# Patient Record
Sex: MALE | Race: WHITE | NOT HISPANIC OR LATINO
[De-identification: names, ages, dates, MRNs, and addresses within clinical notes are randomized per-mention and may not be internally consistent; named-entity substitution may affect disease eponyms.]

---

## 2018-10-12 ENCOUNTER — TRANSCRIPTION ENCOUNTER (OUTPATIENT)
Age: 58
End: 2018-10-12

## 2020-07-13 ENCOUNTER — INPATIENT (INPATIENT)
Facility: HOSPITAL | Age: 60
LOS: 1 days | Discharge: HOME | DRG: 202 | End: 2020-07-15
Attending: FAMILY MEDICINE | Admitting: INTERNAL MEDICINE
Payer: COMMERCIAL

## 2020-07-13 VITALS — WEIGHT: 210.1 LBS

## 2020-07-13 DIAGNOSIS — R94.31 ABNORMAL ELECTROCARDIOGRAM [ECG] [EKG]: ICD-10-CM

## 2020-07-13 DIAGNOSIS — J45.909 UNSPECIFIED ASTHMA, UNCOMPLICATED: ICD-10-CM

## 2020-07-13 DIAGNOSIS — I48.91 UNSPECIFIED ATRIAL FIBRILLATION: ICD-10-CM

## 2020-07-13 DIAGNOSIS — J90 PLEURAL EFFUSION, NOT ELSEWHERE CLASSIFIED: ICD-10-CM

## 2020-07-13 DIAGNOSIS — J45.901 UNSPECIFIED ASTHMA WITH (ACUTE) EXACERBATION: ICD-10-CM

## 2020-07-13 LAB
ALBUMIN SERPL ELPH-MCNC: 3.8 G/DL — SIGNIFICANT CHANGE UP (ref 3.3–5)
ALBUMIN SERPL ELPH-MCNC: 4.1 G/DL — SIGNIFICANT CHANGE UP (ref 3.3–5)
ALP SERPL-CCNC: 79 U/L — SIGNIFICANT CHANGE UP (ref 40–120)
ALP SERPL-CCNC: 83 U/L — SIGNIFICANT CHANGE UP (ref 40–120)
ALT FLD-CCNC: 26 U/L — SIGNIFICANT CHANGE UP (ref 12–78)
ALT FLD-CCNC: 28 U/L — SIGNIFICANT CHANGE UP (ref 12–78)
ANION GAP SERPL CALC-SCNC: 4 MMOL/L — LOW (ref 5–17)
ANION GAP SERPL CALC-SCNC: 6 MMOL/L — SIGNIFICANT CHANGE UP (ref 5–17)
APTT BLD: 28.6 SEC — SIGNIFICANT CHANGE UP (ref 27.5–35.5)
APTT BLD: 29.1 SEC — SIGNIFICANT CHANGE UP (ref 27.5–35.5)
AST SERPL-CCNC: 19 U/L — SIGNIFICANT CHANGE UP (ref 15–37)
AST SERPL-CCNC: 24 U/L — SIGNIFICANT CHANGE UP (ref 15–37)
BASE EXCESS BLDA CALC-SCNC: -0.9 MMOL/L — SIGNIFICANT CHANGE UP (ref -2–2)
BASOPHILS # BLD AUTO: 0.04 K/UL — SIGNIFICANT CHANGE UP (ref 0–0.2)
BASOPHILS # BLD AUTO: 0.12 K/UL — SIGNIFICANT CHANGE UP (ref 0–0.2)
BASOPHILS NFR BLD AUTO: 0.5 % — SIGNIFICANT CHANGE UP (ref 0–2)
BASOPHILS NFR BLD AUTO: 1.2 % — SIGNIFICANT CHANGE UP (ref 0–2)
BILIRUB SERPL-MCNC: 0.3 MG/DL — SIGNIFICANT CHANGE UP (ref 0.2–1.2)
BILIRUB SERPL-MCNC: 0.4 MG/DL — SIGNIFICANT CHANGE UP (ref 0.2–1.2)
BUN SERPL-MCNC: 17 MG/DL — SIGNIFICANT CHANGE UP (ref 7–23)
BUN SERPL-MCNC: 19 MG/DL — SIGNIFICANT CHANGE UP (ref 7–23)
CALCIUM SERPL-MCNC: 8.8 MG/DL — SIGNIFICANT CHANGE UP (ref 8.5–10.1)
CALCIUM SERPL-MCNC: 9.1 MG/DL — SIGNIFICANT CHANGE UP (ref 8.5–10.1)
CHLORIDE SERPL-SCNC: 108 MMOL/L — SIGNIFICANT CHANGE UP (ref 96–108)
CHLORIDE SERPL-SCNC: 108 MMOL/L — SIGNIFICANT CHANGE UP (ref 96–108)
CK SERPL-CCNC: 238 U/L — SIGNIFICANT CHANGE UP (ref 26–308)
CK SERPL-CCNC: 250 U/L — SIGNIFICANT CHANGE UP (ref 26–308)
CK SERPL-CCNC: 270 U/L — SIGNIFICANT CHANGE UP (ref 26–308)
CO2 SERPL-SCNC: 27 MMOL/L — SIGNIFICANT CHANGE UP (ref 22–31)
CO2 SERPL-SCNC: 29 MMOL/L — SIGNIFICANT CHANGE UP (ref 22–31)
CREAT SERPL-MCNC: 1.1 MG/DL — SIGNIFICANT CHANGE UP (ref 0.5–1.3)
CREAT SERPL-MCNC: 1.2 MG/DL — SIGNIFICANT CHANGE UP (ref 0.5–1.3)
D DIMER BLD IA.RAPID-MCNC: 973 NG/ML DDU — HIGH
EOSINOPHIL # BLD AUTO: 0.05 K/UL — SIGNIFICANT CHANGE UP (ref 0–0.5)
EOSINOPHIL # BLD AUTO: 1.23 K/UL — HIGH (ref 0–0.5)
EOSINOPHIL NFR BLD AUTO: 0.6 % — SIGNIFICANT CHANGE UP (ref 0–6)
EOSINOPHIL NFR BLD AUTO: 12.5 % — HIGH (ref 0–6)
GAS PNL BLDA: SIGNIFICANT CHANGE UP
GLUCOSE SERPL-MCNC: 114 MG/DL — HIGH (ref 70–99)
GLUCOSE SERPL-MCNC: 153 MG/DL — HIGH (ref 70–99)
HCO3 BLDA-SCNC: 23 MMOL/L — SIGNIFICANT CHANGE UP (ref 21–29)
HCT VFR BLD CALC: 40.2 % — SIGNIFICANT CHANGE UP (ref 39–50)
HCT VFR BLD CALC: 41.5 % — SIGNIFICANT CHANGE UP (ref 39–50)
HCV AB S/CO SERPL IA: 0.1 S/CO — SIGNIFICANT CHANGE UP (ref 0–0.99)
HCV AB SERPL-IMP: SIGNIFICANT CHANGE UP
HGB BLD-MCNC: 13.4 G/DL — SIGNIFICANT CHANGE UP (ref 13–17)
HGB BLD-MCNC: 13.5 G/DL — SIGNIFICANT CHANGE UP (ref 13–17)
IMM GRANULOCYTES NFR BLD AUTO: 0.3 % — SIGNIFICANT CHANGE UP (ref 0–1.5)
IMM GRANULOCYTES NFR BLD AUTO: 0.4 % — SIGNIFICANT CHANGE UP (ref 0–1.5)
INR BLD: 1.05 RATIO — SIGNIFICANT CHANGE UP (ref 0.88–1.16)
INR BLD: 1.1 RATIO — SIGNIFICANT CHANGE UP (ref 0.88–1.16)
LYMPHOCYTES # BLD AUTO: 0.86 K/UL — LOW (ref 1–3.3)
LYMPHOCYTES # BLD AUTO: 1.78 K/UL — SIGNIFICANT CHANGE UP (ref 1–3.3)
LYMPHOCYTES # BLD AUTO: 10.3 % — LOW (ref 13–44)
LYMPHOCYTES # BLD AUTO: 18.1 % — SIGNIFICANT CHANGE UP (ref 13–44)
MAGNESIUM SERPL-MCNC: 2.2 MG/DL — SIGNIFICANT CHANGE UP (ref 1.6–2.6)
MAGNESIUM SERPL-MCNC: 2.5 MG/DL — SIGNIFICANT CHANGE UP (ref 1.6–2.6)
MCHC RBC-ENTMCNC: 28.6 PG — SIGNIFICANT CHANGE UP (ref 27–34)
MCHC RBC-ENTMCNC: 29.2 PG — SIGNIFICANT CHANGE UP (ref 27–34)
MCHC RBC-ENTMCNC: 32.5 GM/DL — SIGNIFICANT CHANGE UP (ref 32–36)
MCHC RBC-ENTMCNC: 33.3 GM/DL — SIGNIFICANT CHANGE UP (ref 32–36)
MCV RBC AUTO: 87.6 FL — SIGNIFICANT CHANGE UP (ref 80–100)
MCV RBC AUTO: 87.9 FL — SIGNIFICANT CHANGE UP (ref 80–100)
MONOCYTES # BLD AUTO: 0.06 K/UL — SIGNIFICANT CHANGE UP (ref 0–0.9)
MONOCYTES # BLD AUTO: 0.66 K/UL — SIGNIFICANT CHANGE UP (ref 0–0.9)
MONOCYTES NFR BLD AUTO: 0.7 % — LOW (ref 2–14)
MONOCYTES NFR BLD AUTO: 6.7 % — SIGNIFICANT CHANGE UP (ref 2–14)
NEUTROPHILS # BLD AUTO: 6.04 K/UL — SIGNIFICANT CHANGE UP (ref 1.8–7.4)
NEUTROPHILS # BLD AUTO: 7.29 K/UL — SIGNIFICANT CHANGE UP (ref 1.8–7.4)
NEUTROPHILS NFR BLD AUTO: 61.2 % — SIGNIFICANT CHANGE UP (ref 43–77)
NEUTROPHILS NFR BLD AUTO: 87.5 % — HIGH (ref 43–77)
NT-PROBNP SERPL-SCNC: 204 PG/ML — HIGH (ref 0–125)
PCO2 BLDA: 37 MMHG — SIGNIFICANT CHANGE UP (ref 32–46)
PH BLDA: 7.41 — SIGNIFICANT CHANGE UP (ref 7.35–7.45)
PHOSPHATE SERPL-MCNC: 1.4 MG/DL — LOW (ref 2.5–4.5)
PLATELET # BLD AUTO: 232 K/UL — SIGNIFICANT CHANGE UP (ref 150–400)
PLATELET # BLD AUTO: 255 K/UL — SIGNIFICANT CHANGE UP (ref 150–400)
PO2 BLDA: 85 MMHG — SIGNIFICANT CHANGE UP (ref 74–108)
POTASSIUM SERPL-MCNC: 3.7 MMOL/L — SIGNIFICANT CHANGE UP (ref 3.5–5.3)
POTASSIUM SERPL-MCNC: 4.2 MMOL/L — SIGNIFICANT CHANGE UP (ref 3.5–5.3)
POTASSIUM SERPL-SCNC: 3.7 MMOL/L — SIGNIFICANT CHANGE UP (ref 3.5–5.3)
POTASSIUM SERPL-SCNC: 4.2 MMOL/L — SIGNIFICANT CHANGE UP (ref 3.5–5.3)
PROT SERPL-MCNC: 7.5 GM/DL — SIGNIFICANT CHANGE UP (ref 6–8.3)
PROT SERPL-MCNC: 7.8 GM/DL — SIGNIFICANT CHANGE UP (ref 6–8.3)
PROTHROM AB SERPL-ACNC: 12.2 SEC — SIGNIFICANT CHANGE UP (ref 10.6–13.6)
PROTHROM AB SERPL-ACNC: 12.8 SEC — SIGNIFICANT CHANGE UP (ref 10.6–13.6)
RBC # BLD: 4.59 M/UL — SIGNIFICANT CHANGE UP (ref 4.2–5.8)
RBC # BLD: 4.72 M/UL — SIGNIFICANT CHANGE UP (ref 4.2–5.8)
RBC # FLD: 11.6 % — SIGNIFICANT CHANGE UP (ref 10.3–14.5)
RBC # FLD: 11.6 % — SIGNIFICANT CHANGE UP (ref 10.3–14.5)
SAO2 % BLDA: 96 % — SIGNIFICANT CHANGE UP (ref 92–96)
SARS-COV-2 IGG SERPL QL IA: NEGATIVE — SIGNIFICANT CHANGE UP
SARS-COV-2 IGM SERPL IA-ACNC: <3.8 AU/ML — SIGNIFICANT CHANGE UP
SARS-COV-2 RNA SPEC QL NAA+PROBE: SIGNIFICANT CHANGE UP
SODIUM SERPL-SCNC: 141 MMOL/L — SIGNIFICANT CHANGE UP (ref 135–145)
SODIUM SERPL-SCNC: 141 MMOL/L — SIGNIFICANT CHANGE UP (ref 135–145)
TROPONIN I SERPL-MCNC: <0.015 NG/ML — SIGNIFICANT CHANGE UP (ref 0.01–0.04)
WBC # BLD: 8.33 K/UL — SIGNIFICANT CHANGE UP (ref 3.8–10.5)
WBC # BLD: 9.86 K/UL — SIGNIFICANT CHANGE UP (ref 3.8–10.5)
WBC # FLD AUTO: 8.33 K/UL — SIGNIFICANT CHANGE UP (ref 3.8–10.5)
WBC # FLD AUTO: 9.86 K/UL — SIGNIFICANT CHANGE UP (ref 3.8–10.5)

## 2020-07-13 PROCEDURE — 84481 FREE ASSAY (FT-3): CPT

## 2020-07-13 PROCEDURE — 84439 ASSAY OF FREE THYROXINE: CPT

## 2020-07-13 PROCEDURE — 93005 ELECTROCARDIOGRAM TRACING: CPT

## 2020-07-13 PROCEDURE — 82550 ASSAY OF CK (CPK): CPT

## 2020-07-13 PROCEDURE — 80061 LIPID PANEL: CPT

## 2020-07-13 PROCEDURE — 99497 ADVNCD CARE PLAN 30 MIN: CPT | Mod: 25

## 2020-07-13 PROCEDURE — 99223 1ST HOSP IP/OBS HIGH 75: CPT

## 2020-07-13 PROCEDURE — 94640 AIRWAY INHALATION TREATMENT: CPT

## 2020-07-13 PROCEDURE — 85610 PROTHROMBIN TIME: CPT

## 2020-07-13 PROCEDURE — 86769 SARS-COV-2 COVID-19 ANTIBODY: CPT

## 2020-07-13 PROCEDURE — 93970 EXTREMITY STUDY: CPT | Mod: 26

## 2020-07-13 PROCEDURE — 84100 ASSAY OF PHOSPHORUS: CPT

## 2020-07-13 PROCEDURE — 85730 THROMBOPLASTIN TIME PARTIAL: CPT

## 2020-07-13 PROCEDURE — 80048 BASIC METABOLIC PNL TOTAL CA: CPT

## 2020-07-13 PROCEDURE — 83735 ASSAY OF MAGNESIUM: CPT

## 2020-07-13 PROCEDURE — 84484 ASSAY OF TROPONIN QUANT: CPT

## 2020-07-13 PROCEDURE — 94760 N-INVAS EAR/PLS OXIMETRY 1: CPT

## 2020-07-13 PROCEDURE — 71275 CT ANGIOGRAPHY CHEST: CPT | Mod: 26

## 2020-07-13 PROCEDURE — 83036 HEMOGLOBIN GLYCOSYLATED A1C: CPT

## 2020-07-13 PROCEDURE — 85025 COMPLETE CBC W/AUTO DIFF WBC: CPT

## 2020-07-13 PROCEDURE — 36415 COLL VENOUS BLD VENIPUNCTURE: CPT

## 2020-07-13 PROCEDURE — 93306 TTE W/DOPPLER COMPLETE: CPT

## 2020-07-13 PROCEDURE — 93306 TTE W/DOPPLER COMPLETE: CPT | Mod: 26

## 2020-07-13 PROCEDURE — 93010 ELECTROCARDIOGRAM REPORT: CPT | Mod: 76

## 2020-07-13 PROCEDURE — 80053 COMPREHEN METABOLIC PANEL: CPT

## 2020-07-13 PROCEDURE — 86803 HEPATITIS C AB TEST: CPT

## 2020-07-13 PROCEDURE — 84443 ASSAY THYROID STIM HORMONE: CPT

## 2020-07-13 PROCEDURE — 82803 BLOOD GASES ANY COMBINATION: CPT

## 2020-07-13 PROCEDURE — 71045 X-RAY EXAM CHEST 1 VIEW: CPT | Mod: 26

## 2020-07-13 PROCEDURE — 12345: CPT | Mod: NC

## 2020-07-13 PROCEDURE — 99255 IP/OBS CONSLTJ NEW/EST HI 80: CPT

## 2020-07-13 PROCEDURE — 36600 WITHDRAWAL OF ARTERIAL BLOOD: CPT

## 2020-07-13 PROCEDURE — 93970 EXTREMITY STUDY: CPT

## 2020-07-13 RX ORDER — MAGNESIUM SULFATE 500 MG/ML
1 VIAL (ML) INJECTION ONCE
Refills: 0 | Status: COMPLETED | OUTPATIENT
Start: 2020-07-13 | End: 2020-07-13

## 2020-07-13 RX ORDER — ALBUTEROL 90 UG/1
1 AEROSOL, METERED ORAL EVERY 4 HOURS
Refills: 0 | Status: DISCONTINUED | OUTPATIENT
Start: 2020-07-13 | End: 2020-07-13

## 2020-07-13 RX ORDER — TIOTROPIUM BROMIDE 18 UG/1
1 CAPSULE ORAL; RESPIRATORY (INHALATION) DAILY
Refills: 0 | Status: DISCONTINUED | OUTPATIENT
Start: 2020-07-13 | End: 2020-07-13

## 2020-07-13 RX ORDER — DIPHENHYDRAMINE HCL 50 MG
50 CAPSULE ORAL ONCE
Refills: 0 | Status: DISCONTINUED | OUTPATIENT
Start: 2020-07-13 | End: 2020-07-13

## 2020-07-13 RX ORDER — SODIUM CHLORIDE 9 MG/ML
1000 INJECTION INTRAMUSCULAR; INTRAVENOUS; SUBCUTANEOUS ONCE
Refills: 0 | Status: COMPLETED | OUTPATIENT
Start: 2020-07-13 | End: 2020-07-13

## 2020-07-13 RX ORDER — DILTIAZEM HCL 120 MG
30 CAPSULE, EXT RELEASE 24 HR ORAL EVERY 6 HOURS
Refills: 0 | Status: DISCONTINUED | OUTPATIENT
Start: 2020-07-13 | End: 2020-07-13

## 2020-07-13 RX ORDER — POTASSIUM PHOSPHATE, MONOBASIC POTASSIUM PHOSPHATE, DIBASIC 236; 224 MG/ML; MG/ML
15 INJECTION, SOLUTION INTRAVENOUS ONCE
Refills: 0 | Status: DISCONTINUED | OUTPATIENT
Start: 2020-07-13 | End: 2020-07-13

## 2020-07-13 RX ORDER — IPRATROPIUM/ALBUTEROL SULFATE 18-103MCG
3 AEROSOL WITH ADAPTER (GRAM) INHALATION ONCE
Refills: 0 | Status: COMPLETED | OUTPATIENT
Start: 2020-07-13 | End: 2020-07-13

## 2020-07-13 RX ORDER — DILTIAZEM HCL 120 MG
30 CAPSULE, EXT RELEASE 24 HR ORAL ONCE
Refills: 0 | Status: COMPLETED | OUTPATIENT
Start: 2020-07-13 | End: 2020-07-13

## 2020-07-13 RX ORDER — ALBUTEROL 90 UG/1
1 AEROSOL, METERED ORAL
Refills: 0 | Status: DISCONTINUED | OUTPATIENT
Start: 2020-07-13 | End: 2020-07-14

## 2020-07-13 RX ORDER — ALBUTEROL 90 UG/1
2 AEROSOL, METERED ORAL ONCE
Refills: 0 | Status: COMPLETED | OUTPATIENT
Start: 2020-07-13 | End: 2020-07-13

## 2020-07-13 RX ORDER — DILTIAZEM HCL 120 MG
30 CAPSULE, EXT RELEASE 24 HR ORAL EVERY 6 HOURS
Refills: 0 | Status: DISCONTINUED | OUTPATIENT
Start: 2020-07-13 | End: 2020-07-14

## 2020-07-13 RX ORDER — IPRATROPIUM/ALBUTEROL SULFATE 18-103MCG
3 AEROSOL WITH ADAPTER (GRAM) INHALATION EVERY 6 HOURS
Refills: 0 | Status: DISCONTINUED | OUTPATIENT
Start: 2020-07-13 | End: 2020-07-13

## 2020-07-13 RX ORDER — ALBUTEROL 90 UG/1
2 AEROSOL, METERED ORAL EVERY 4 HOURS
Refills: 0 | Status: DISCONTINUED | OUTPATIENT
Start: 2020-07-13 | End: 2020-07-14

## 2020-07-13 RX ADMIN — Medication 62.5 MILLIMOLE(S): at 13:45

## 2020-07-13 RX ADMIN — ALBUTEROL 2 PUFF(S): 90 AEROSOL, METERED ORAL at 10:30

## 2020-07-13 RX ADMIN — Medication 30 MILLIGRAM(S): at 18:43

## 2020-07-13 RX ADMIN — ALBUTEROL 2 PUFF(S): 90 AEROSOL, METERED ORAL at 20:39

## 2020-07-13 RX ADMIN — Medication 3 MILLILITER(S): at 06:33

## 2020-07-13 RX ADMIN — SODIUM CHLORIDE 1000 MILLILITER(S): 9 INJECTION INTRAMUSCULAR; INTRAVENOUS; SUBCUTANEOUS at 04:20

## 2020-07-13 RX ADMIN — Medication 125 MILLIGRAM(S): at 03:41

## 2020-07-13 RX ADMIN — SODIUM CHLORIDE 1000 MILLILITER(S): 9 INJECTION INTRAMUSCULAR; INTRAVENOUS; SUBCUTANEOUS at 00:20

## 2020-07-13 RX ADMIN — Medication 1 GRAM(S): at 07:25

## 2020-07-13 RX ADMIN — Medication 100 GRAM(S): at 05:24

## 2020-07-13 RX ADMIN — Medication 40 MILLIGRAM(S): at 12:51

## 2020-07-13 RX ADMIN — Medication 40 MILLIGRAM(S): at 17:42

## 2020-07-13 RX ADMIN — Medication 40 MILLIGRAM(S): at 06:33

## 2020-07-13 RX ADMIN — Medication 30 MILLIGRAM(S): at 12:34

## 2020-07-13 RX ADMIN — Medication 3 MILLILITER(S): at 05:24

## 2020-07-13 RX ADMIN — ALBUTEROL 2 PUFF(S): 90 AEROSOL, METERED ORAL at 03:40

## 2020-07-13 NOTE — PROGRESS NOTE ADULT - SUBJECTIVE AND OBJECTIVE BOX
Subjective:  Patient is a 60y old  Male who presents with a chief complaint of SOB     HPI:  59 y/o M with PMH of a-fib on ASA, asthma admitted on 7/13/20 with 2 days history of SOB and wheezing. Patient states he developed SOB, wheezing and tightness in his chest 2 days ago. He [facility] to [facility] and was given inhaler, which he has been using. It provides him with only some mild temporary relief, and has gotten progressively worse. Patient denies cough, runny nose, sore throat, nausea, vomiting, abdominal pain, CP. Patient states that he feels improved since coming to ED. Denies sick contacts and denies exposure to COVID19, states he has been quarantining during pandemic  Patient states he used to see a pulmonologist 8 years ago, but hasn't since one since, and doesn't take any medications. Denies h/o asthma exacerbation requiring hospitalization or intubation previously   PSH: Shoulder surgery  Social Hx: Denies x 3  Family Hx: Mother - breast cancer     7/14/20 - Patient seen and examined at bedside earlier today, denies cp, + dyspnea, wheezing, tightness in the chest , afebrile, + cough    Review of system- Rest of the review of system are negative except mentioned in HPI    OBJECTIVE:   T(C): 36.9 (07-13-20 @ 12:21), Max: 37.2 (07-13-20 @ 10:48)  HR: 102 (07-13-20 @ 12:21) (102 - 115)  BP: 154/82 (07-13-20 @ 12:21) (136/78 - 177/102)  RR: 19 (07-13-20 @ 12:21) (13 - 21)  SpO2: 97% (07-13-20 @ 12:21) (93% - 100%)  Wt(kg): --  Daily     Daily   CAPILLARY BLOOD GLUCOSE        PHYSICAL EXAM:  GENERAL: NAD  NERVOUS SYSTEM:  Alert & Oriented X3, non- focal exam,  Motor Strength 5/5 B/L upper and lower extremities; DTRs 2+ intact and symmetric  HEAD:  Atraumatic, Normocephalic  EYES: EOMI, PERRLA, conjunctiva and sclera clear  HEENT: Moist mucous membranes  NECK: Supple, No JVD  CHEST/LUNG: BS decreased over left base, No rales, no rhonchi, + expiratory wheezing  HEART: tachycardic  No murmurs, no rubs or gallops  ABDOMEN: Soft, Nontender, Nondistended; Bowel sounds present  GENITOURINARY- Voiding, no suprapubic tenderness  EXTREMITIES:  2+ Peripheral Pulses, No clubbing, cyanosis,   edema  MUSCULOSKELETAL:- No muscle tenderness, Muscle tone normal, No joint tenderness, no Joint swelling,  Joint ROM -normal  SKIN-no rash, no lesion    LABS: all reviewed                        13.4   8.33  )-----------( 232      ( 13 Jul 2020 07:19 )             40.2     07-13    141  |  108  |  17  ----------------------------<  153<H>  4.2   |  27  |  1.10    Ca    9.1      13 Jul 2020 07:19  Phos  1.4     07-13  Mg     2.5     07-13    TPro  7.5  /  Alb  3.8  /  TBili  0.4  /  DBili  x   /  AST  19  /  ALT  26  /  AlkPhos  79  07-13    PT/INR - ( 13 Jul 2020 07:19 )   PT: 12.8 sec;   INR: 1.10 ratio         PTT - ( 13 Jul 2020 07:19 )  PTT:28.6 sec    CARDIAC MARKERS ( 13 Jul 2020 11:40 )  <0.015 ng/mL / x     / 250 U/L / x     / x      CARDIAC MARKERS ( 13 Jul 2020 03:22 )  <0.015 ng/mL / x     / 270 U/L / x     / x        Serum Pro-Brain Natriuretic Peptide (07.13.20 @ 03:22)    Serum Pro-Brain Natriuretic Peptide: 204 pg/mL        ABG - ( 13 Jul 2020 06:21 )  pH, Arterial: 7.41  pH, Blood: x     /  pCO2: 37    /  pO2: 85    / HCO3: 23    / Base Excess: -.9   /  SaO2: 96          RECENT CULTURES:    RADIOLOGY & ADDITIONAL TESTS: all reviewed   EKG reviewed  < from: 12 Lead ECG (07.13.20 @ 10:59) >  Ventricular Rate 121 BPM    Atrial Rate 121 BPM    P-R Interval 156 ms    QRS Duration 86 ms    Q-T Interval 314 ms    QTC Calculation(Bezet) 445 ms    P Axis 72 degrees    R Axis 71 degrees    T Axis 18 degrees    Diagnosis Line Sinus tachycardia  Possible Left atrial enlargement  T wave abnormality, consider inferior ischemia  Abnormal ECG  When compared with ECG of 13-JUL-2020 03:21,  No significant change was found    < end of copied text >  < from: US Duplex Venous Lower Ext Complete, Bilateral (07.13.20 @ 09:07) >  There is normal compressibility of the bilateral common femoral, femoral and popliteal veins.   Doppler examination shows normal spontaneous and phasic flow.    No calf vein thrombosis is detected. Prominent caliber of a muscular branch to the gastrocnemius on the right.    IMPRESSION:   No evidence of deep venous thrombosis in either lower extremity.      < end of copied text >  < from: CT Angio Chest PE Protocol w/ IV Cont (07.13.20 @ 04:44) >    LUNGS AND AIRWAYS: Patent central airways. No pulmonary nodules, masses or consolidation. Left upper lobe linear atelectasis. Left upper lobe calcified granuloma.  PLEURA: Small left pleural effusion.  MEDIASTINUM AND ROSY: No lymphadenopathy.  VESSELS: No pulmonary embolism in the main, lobar or segmental pulmonary arteries. Evaluation of the subsegmental pulmonary arteries are limited secondary to motion artifact.  HEART: Heart size is normal. No pericardial effusion.  CHEST WALL AND LOWER NECK: Within normal limits.  VISUALIZED UPPER ABDOMEN: Within normal limits.  BONES: Degenerative changes of the spine.    IMPRESSION:   1. No pulmonary embolism in the main, lobar or segmental pulmonary arteries. Evaluation of the subsegmental pulmonary arteries are limited secondary to motion artifact.  2. Smallleft pleural effusion.      < end of copied text >          Current medications:  ALBUTerol    90 MICROgram(s) HFA Inhaler 1 Puff(s) Inhalation every 2 hours PRN  ALBUTerol    90 MICROgram(s) HFA Inhaler 2 Puff(s) Inhalation every 4 hours  azithromycin   Tablet 500 milliGRAM(s) Oral daily  diltiazem    Tablet 30 milliGRAM(s) Oral every 6 hours  guaiFENesin  milliGRAM(s) Oral every 12 hours  ipratropium 17 MICROgram(s) HFA Inhaler 1 Puff(s) Inhalation every 6 hours  methylPREDNISolone sodium succinate Injectable 40 milliGRAM(s) IV Push every 6 hours  montelukast 10 milliGRAM(s) Oral daily  pantoprazole    Tablet 40 milliGRAM(s) Oral before breakfast

## 2020-07-13 NOTE — ED ADULT NURSE REASSESSMENT NOTE - NS ED NURSE REASSESS COMMENT FT1
Mona Crespo wife [phone]
Mona, Wife, [phone]
Coughing intermittently, no respiratory distress.

## 2020-07-13 NOTE — ED ADULT NURSE NOTE - NSIMPLEMENTINTERV_GEN_ALL_ED
Implemented All Universal Safety Interventions:  [place] to call system. Call bell, personal items and telephone within reach. Instruct patient to call for assistance. Room bathroom lighting operational. Non-slip footwear when patient is off stretcher. Physically safe environment: no spills, clutter or unnecessary equipment. Stretcher in lowest position, wheels locked, appropriate side rails in place.

## 2020-07-13 NOTE — CONSULT NOTE ADULT - ASSESSMENT
O2 as needed.  IV solumedrol.  inhaled albuterol and ipratropium.  montelukast p.o.    telemetry. cardiology to see patient.    repeaat CXR in 1-2 days re L pleural effusion.     case discusssed with primary team. O2 as needed.  IV solumedrol.  inhaled albuterol and ipratropium.  montelukast p.o.    telemetry. cardiology to see patient.    repeat CXR in 1-2 days re L pleural effusion.     case discusssed with primary team.

## 2020-07-13 NOTE — PROGRESS NOTE ADULT - ASSESSMENT
HPI:  59 y/o M with PMH of a-fib on ASA, asthma admitted on 7/13/20 with 2 days history of SOB,  wheezing, cough    *Asthma Exacerbation ruled out PE, COVID PCR negative   * Small left pleural effusion  - IV solumedrol 40 q6h with PPI, Zithromax   - inhalers, mucolytics, incentive spirometry, montelukast   - tele monitoring, 2 d echo  - pulmonary and cardiology evaluation  * Falsely Elevated d-dimers ruled out PE/DVT  * Paroxysmal A fib with low CHADS score on ASA  * Abnormal ekg with T changes in anterior leads  - start Cardizem 30 q6h , 2 d echo, serial troponin, ekg  - check A1C,  TSH,  lipid profile  - cardiology evaluation  * Hypophosphatemia- replace, monitor  * HTN - Cardizem     *Advance Directives  -Patient states health care proxy is his wife Mona. Discussed cardiac resuscitation and intubation / mechanical ventilation, patient wishes to have both if necessary, and is Full Code.     *DVT ppx  -SCDs

## 2020-07-13 NOTE — CONSULT NOTE ADULT - PROBLEM SELECTOR PROBLEM 1
Atrial fibrillation, unspecified type
Asthma with acute exacerbation, unspecified asthma severity, unspecified whether persistent

## 2020-07-13 NOTE — H&P ADULT - ASSESSMENT
59 y/o M with PMH of a-fib, asthma, p/w SOB and wheezing.    *Asthma Exacerbation  -Steroids  -Nebs Q4H   -Zithromax   -COVID pending  -Pulse ox monitoring  -Pulm consult  -Small L pleural effusion noted on CT -> Echo   -Elevated D-dimer -> CT angio negative for PE, F/u U/S of LEs   -ABG  -CICU    *Advance Directives  -Patient states health care proxy is his wife Mona. Discussed cardiac resuscitation and intubation / mechanical ventilation, patient wishes to have both if necessary, and is Full Code.     *DVT ppx  -SCDs 61 y/o M with PMH of a-fib, asthma, p/w SOB and wheezing.    *Asthma Exacerbation  -Steroids  -Nebs standing  -Zithromax   -COVID pending - discussed with pharmacist, given patient's respiratory status, will give nebs   -Pulse ox monitoring  -Pulm consult  -Small L pleural effusion noted on CT -> Echo   -Elevated D-dimer -> CT angio negative for PE, F/u U/S of LEs   -ABG  -CICU    *Advance Directives  -Patient states health care proxy is his wife Mona. Discussed cardiac resuscitation and intubation / mechanical ventilation, patient wishes to have both if necessary, and is Full Code.     *DVT ppx  -SCDs

## 2020-07-13 NOTE — ED PROVIDER NOTE - CONSTITUTIONAL, MLM
normal... Well appearing, awake, alert, oriented to person, place, time/situation and in mild respiratory distress. Leaning forward, anxious

## 2020-07-13 NOTE — ED PROVIDER NOTE - OBJECTIVE STATEMENT
Pt. is a 61 yo M with a hx of atrial fibrillation in the past not taking any anticoagulation, hx of asthma presenting with difficulty breathing.  Patient states for 2-3 days he has had dry cough, wheezing and difficulty breathing.  Patient states breathing worsened last night despite using inhaler all day.   Pt. saw PMD last week and was prescribed albuterol inhaler although he has not needed albuterol in years.  Patient exercises regularly.  He takes care of his elderly mother and states she was recently negative for coronavirus. Patient denies fever, chills, sweats, runny nose, sore throat, vomiting or diarrhea. Denies leg pain, leg swelling or travel. Pt. is a 61 yo M with a hx of atrial fibrillation in the past not taking any anticoagulation, hx of asthma presenting with difficulty breathing.  Patient states for 2-3 days he has had dry cough, wheezing and difficulty breathing.  Patient states breathing worsened last night despite using inhaler all day.   Pt. saw PMD last week and was prescribed albuterol inhaler although he has not needed albuterol in years.  Patient exercises regularly.  He takes care of his elderly mother and states she was recently negative for coronavirus. Patient denies fever, chills, sweats, runny nose, sore throat, vomiting or diarrhea. Denies leg pain, leg swelling or travel. PMD: [place]

## 2020-07-13 NOTE — ED PROVIDER NOTE - SKIN, MLM
Skin normal color for race, warm, dry and intact. No evidence of rash. Good cap refill. No cyanosis.

## 2020-07-13 NOTE — CONSULT NOTE ADULT - PROBLEM SELECTOR RECOMMENDATION 2
T wave inversions are nonspecific, not due to ischemia.  Echo pending, if no RWMA abnormalities no further workup.

## 2020-07-13 NOTE — ED ADULT TRIAGE NOTE - CHIEF COMPLAINT QUOTE
Pt presents to ED c/o difficulty breathing. Hx asthma, has noticed that his proair inhaler only last him a couple of hours before he has to use it again for relief.. Denies cough, fever, CP. pt speaking in full sentences in triage.

## 2020-07-13 NOTE — CONSULT NOTE ADULT - SUBJECTIVE AND OBJECTIVE BOX
HPI:  59 y/o M with PMH of a-fib, asthma, p/w SOB and wheezing. Patient states he developed SOB, wheezing and tightness in his chest 2 days ago. [facility] and was given inhaler, which he has been using. It provides him with only some mild temporary relief, and has gotten progressively worse. Patient denies runny nose, sore throat, nausea, vomiting, abdominal pain, CP. Patient states that he feels improved since coming to ED. Denies sick contacts and denies exposure to COVID19, states he has been quarantining during pandemic.      Patient states he used to see a pulmonologist Dr AB Cross, 8 years ago (was on inhalers at that time), but hasn't since one since, and doesn't take any medications. Denies h/o asthma exacerbation requiring hospitalization or intubation previously.     had exposure to a cat 2 days ago (is allergic).  Is a nonsmoker.     7/13: tightness/breathing improving. is wheezing.  slight cough, slight clear sputum. denies fever or chills.     PSH: Shoulder surgery    Social Hx: Denies x 3    Family Hx: Mother - breast cancer (13 Jul 2020 05:43)      PAST MEDICAL & SURGICAL HISTORY:  Atrial fibrillation, unspecified type.  h.o. PAF followed by cardiology.   Mild intermittent asthma with acute exacerbation  No significant past surgical history      MEDICATIONS  (STANDING):  ALBUTerol    90 MICROgram(s) HFA Inhaler 2 Puff(s) Inhalation every 4 hours  albuterol/ipratropium for Nebulization 3 milliLiter(s) Nebulizer every 6 hours  azithromycin   Tablet 500 milliGRAM(s) Oral daily  guaiFENesin  milliGRAM(s) Oral every 12 hours  ipratropium 17 MICROgram(s) HFA Inhaler 1 Puff(s) Inhalation every 6 hours  methylPREDNISolone sodium succinate Injectable 40 milliGRAM(s) IV Push every 6 hours  pantoprazole    Tablet 40 milliGRAM(s) Oral before breakfast  sodium phosphate IVPB 15 milliMole(s) IV Intermittent once    MEDICATIONS  (PRN):  ALBUTerol    90 MICROgram(s) HFA Inhaler 1 Puff(s) Inhalation every 2 hours PRN Shortness of Breath and/or Wheezing      Allergies    No Known Allergies    Intolerances        SOCIAL HISTORY: Denies tobacco, etoh abuse or illicit drug use    FAMILY HISTORY:  No pertinent family history in first degree relatives      Vital Signs Last 24 Hrs  T(C): 36.9 (13 Jul 2020 12:21), Max: 37.2 (13 Jul 2020 10:48)  T(F): 98.5 (13 Jul 2020 12:21), Max: 99 (13 Jul 2020 10:48)  HR: 102 (13 Jul 2020 12:21) (102 - 115)  BP: 154/82 (13 Jul 2020 12:21) (136/78 - 177/102)  BP(mean): 107 (13 Jul 2020 10:48) (88 - 120)  RR: 19 (13 Jul 2020 12:21) (13 - 21)  SpO2: 97% (13 Jul 2020 12:21) (93% - 100%)    REVIEW OF SYSTEMS:    CONSTITUTIONAL:  As per HPI.  HEENT:  Eyes:  No diplopia or blurred vision. ENT:  No earache, sore throat or runny nose.  CARDIOVASCULAR:  see above.  RESPIRATORY:  see above.   GASTROINTESTINAL:  No nausea, vomiting or diarrhea.  GENITOURINARY:  No dysuria, frequency or urgency.  MUSCULOSKELETAL:  As per HPI.  SKIN:  No change in skin, hair or nails.  NEUROLOGIC:  No paresthesias, fasciculations, seizures or weakness.  PSYCHIATRIC:  No disorder of thought or mood.  ENDOCRINE:  No heat or cold intolerance, polyuria or polydipsia.  HEMATOLOGICAL:  No easy bruising or bleedings:  .     PHYSICAL EXAMINATION:    GENERAL APPEARANCE:  Pt. is not currently dyspneic, in no distress. Pt. is alert, oriented, and pleasant.  HEENT:  Pupils are normal and react normally. No icterus. Mucous membranes well colored.  NECK:  Supple. No lymphadenopathy. Jugular venous pressure not elevated. Carotids equal.   HEART:  .   CHEST:  Decreased aud BS's. Expiratory wheeze with forced expir manuever.   ABDOMEN:  Soft and nontender.   EXTREMITIES:  There is no cyanosis, clubbing or edema.   SKIN:  No rash or significant lesions are noted.  Neuro: Alert, awake, and O x 3.      LABS:                        13.4   8.33  )-----------( 232      ( 13 Jul 2020 07:19 )             40.2     07-13    141  |  108  |  17  ----------------------------<  153<H>  4.2   |  27  |  1.10    Ca    9.1      13 Jul 2020 07:19  Phos  1.4     07-13  Mg     2.5     07-13    TPro  7.5  /  Alb  3.8  /  TBili  0.4  /  DBili  x   /  AST  19  /  ALT  26  /  AlkPhos  79  07-13    LIVER FUNCTIONS - ( 13 Jul 2020 07:19 )  Alb: 3.8 g/dL / Pro: 7.5 gm/dL / ALK PHOS: 79 U/L / ALT: 26 U/L / AST: 19 U/L / GGT: x           PT/INR - ( 13 Jul 2020 07:19 )   PT: 12.8 sec;   INR: 1.10 ratio         PTT - ( 13 Jul 2020 07:19 )  PTT:28.6 sec  CARDIAC MARKERS ( 13 Jul 2020 11:40 )  <0.015 ng/mL / x     / 250 U/L / x     / x      CARDIAC MARKERS ( 13 Jul 2020 03:22 )  <0.015 ng/mL / x     / 270 U/L / x     / x            ABG - ( 13 Jul 2020 06:21 )  pH, Arterial: 7.41  pH, Blood: x     /  pCO2: 37    /  pO2: 85    / HCO3: 23    / Base Excess: -.9   /  SaO2: 96            EXAM:  US DPLX LWR EXT VEINS COMPL BI                            PROCEDURE DATE:  07/13/2020          INTERPRETATION:  CLINICAL INFORMATION: Elevated d-dimer.    COMPARISON: None available.    TECHNIQUE: Duplex sonography of the BILATERAL LOWER extremity veins with color and spectral Doppler, with and without compression.      FINDINGS:    There is normal compressibility of the bilateral common femoral, femoral and popliteal veins.   Doppler examination shows normal spontaneous and phasic flow.    No calf vein thrombosis is detected. Prominent caliber of a muscular branch to the gastrocnemius on the right.    IMPRESSION:   No evidence of deep venous thrombosis in either lower extremity.                RADIOLOGY & ADDITIONAL STUDIES:       EXAM:  CTA CHEST PE PROTOCOL (W)AW IC                            PROCEDURE DATE:  07/13/2020          INTERPRETATION:  CLINICAL INFORMATION: Shortness of breath, elevated d-dimer and hypoxia. Asthma.    COMPARISON: None.    PROCEDURE:   CT Angiography of the Chest.  90 ml of Omnipaque 350 was injected intravenously. 10 ml were discarded.  Sagittal and coronal reformats were performed as well as 3D (MIP) reconstructions.    FINDINGS:    LUNGS AND AIRWAYS: Patent central airways. No pulmonary nodules, masses or consolidation. Left upper lobe linear atelectasis. Left upper lobe calcified granuloma.  PLEURA: Small left pleural effusion.  MEDIASTINUM AND ROSY: No lymphadenopathy.  VESSELS: No pulmonary embolism in the main, lobar or segmental pulmonary arteries. Evaluation of the subsegmental pulmonary arteries are limited secondary to motion artifact.  HEART: Heart size is normal. No pericardial effusion.  CHEST WALL AND LOWER NECK: Within normal limits.  VISUALIZED UPPER ABDOMEN: Within normal limits.  BONES: Degenerative changes of the spine.    IMPRESSION:   1. No pulmonary embolism in the main, lobar or segmental pulmonary arteries. Evaluation of the subsegmental pulmonary arteries are limited secondary to motion artifact.  2. Smallleft pleural effusion.

## 2020-07-13 NOTE — ED PROVIDER NOTE - ENMT, MLM
Airway patent, Nasal mucosa clear. Mouth with slightly dry mucosa. Throat has no vesicles, no oropharyngeal exudates and uvula is midline. No JVD.

## 2020-07-13 NOTE — H&P ADULT - HISTORY OF PRESENT ILLNESS
61 y/o M with PMH of a-fib, asthma, p/w SOB and wheezing. Patient states he developed SOB, wheezing and tightness in his chest 2 days ago. [facility] and was given inhaler, which he has been using. It provides him only some temporary relief. Patient denies cough, runny nose, sore throat, nausea, vomiting, abdominal pain, CP. Patient states that he feels improved since coming to ED. Denies sick contacts and denies exposure to COVID19, states he has been quarantining during pandemic     Patient states he used to see a pulmonologist 8 years ago, but hasn't since one since, and doesn't take any medications. Denies h/o asthma exacerbation requiring hospitalization or intubation previously     PSH: Shoulder surgery    Social Hx: Denies x 3    Family Hx: Mother - breast cancer 59 y/o M with PMH of a-fib, asthma, p/w SOB and wheezing. Patient states he developed SOB, wheezing and tightness in his chest 2 days ago. He [facility] and was given inhaler, which he has been using. It provides him with only some mild temporary relief, and has gotten progressively worse. Patient denies cough, runny nose, sore throat, nausea, vomiting, abdominal pain, CP. Patient states that he feels improved since coming to ED. Denies sick contacts and denies exposure to COVID19, states he has been quarantining during pandemic     Patient states he used to see a pulmonologist 8 years ago, but hasn't since one since, and doesn't take any medications. Denies h/o asthma exacerbation requiring hospitalization or intubation previously     PSH: Shoulder surgery    Social Hx: Denies x 3    Family Hx: Mother - breast cancer

## 2020-07-13 NOTE — ED PROVIDER NOTE - NS ED MD EM SELECTION
Pt seen for length of stay, information obtained from patient. Noted pt tired at time of visit, falling asleep during interview.
[id number] Comprehensive

## 2020-07-13 NOTE — CONSULT NOTE ADULT - SUBJECTIVE AND OBJECTIVE BOX
61 y/o M with PMH of a-fib, asthma presents w/ wheezing & tightness in chest x 2 days.  Admitted for asthma exacerbation.  Reports  wheezing, tightness in chest.  Given inhaler at an  urgent care which provided only minimal relief.  Started on steroids & nebs & antibiotics w/ improvement in symptoms.  currently at baseline.  Reports h/o paroxysmal afib x yrs.  CHADS2-VASc=0 episodes 1-2 a year lasting hrs.  Followed by a cardiologist not in this area.  No chest pain, wt gain, PND, orthopnea, LE edema, palpitations, recent afib episodes or other cardiac symptoms.      PAST MEDICAL & SURGICAL HISTORY:  Atrial fibrillation, unspecified type  Mild intermittent asthma with acute exacerbation  No significant past surgical history    Allergies  No Known Allergies  Intolerances    SOCIAL HISTORY: neg x 3    FAMILY HISTORY:  No pertinent family history in first degree relatives      MEDICATIONS:  MEDICATIONS  (STANDING):  ALBUTerol    90 MICROgram(s) HFA Inhaler 2 Puff(s) Inhalation every 4 hours  azithromycin   Tablet 500 milliGRAM(s) Oral daily  diltiazem    Tablet 30 milliGRAM(s) Oral every 6 hours  guaiFENesin  milliGRAM(s) Oral every 12 hours  ipratropium 17 MICROgram(s) HFA Inhaler 1 Puff(s) Inhalation every 6 hours  methylPREDNISolone sodium succinate Injectable 40 milliGRAM(s) IV Push every 6 hours  montelukast 10 milliGRAM(s) Oral daily  pantoprazole    Tablet 40 milliGRAM(s) Oral before breakfast    MEDICATIONS  (PRN):  ALBUTerol    90 MICROgram(s) HFA Inhaler 1 Puff(s) Inhalation every 2 hours PRN Shortness of Breath and/or Wheezing      REVIEW OF SYSTEMS:    CONSTITUTIONAL: No weakness, fevers or chills  EYES/ENT: No visual changes;  No vertigo or throat pain   NECK: No pain or stiffness  RESPIRATORY: No cough, wheezing, hemoptysis; +shortness of breath  CARDIOVASCULAR: No chest pain or palpitations  GASTROINTESTINAL: No abdominal or epigastric pain. No nausea, vomiting, or hematemesis; No diarrhea or constipation. No melena or hematochezia.  GENITOURINARY: No dysuria, frequency or hematuria  NEUROLOGICAL: No numbness or weakness  SKIN: No itching, burning, rashes, or lesions   All other review of systems is negative unless indicated above    Vital Signs Last 24 Hrs  T(C): 36.9 (13 Jul 2020 12:21), Max: 37.2 (13 Jul 2020 10:48)  T(F): 98.5 (13 Jul 2020 12:21), Max: 99 (13 Jul 2020 10:48)  HR: 105 (13 Jul 2020 18:26) (102 - 115)  BP: 160/69 (13 Jul 2020 18:26) (136/78 - 177/102)  BP(mean): 107 (13 Jul 2020 10:48) (88 - 120)  RR: 19 (13 Jul 2020 12:21) (13 - 21)  SpO2: 97% (13 Jul 2020 12:21) (93% - 100%)    I&O's Summary      PHYSICAL EXAM:    Constitutional: NAD, awake and alert, well-developed  HEENT: PERR, EOMI,  No oral cyananosis.  Neck:  supple,  No JVD  Respiratory: Breath sounds are clear bilaterally, wheezing at bases posteriorly, no rales or rhonchi  Cardiovascular: S1 and S2, regular rate and rhythm, no Murmurs, gallops or rubs  Gastrointestinal: Bowel Sounds present, soft, nontender.   Extremities: No peripheral edema. No clubbing or cyanosis.  Vascular: 2+ peripheral pulses  Neurological: A/O x 3, no focal deficits      LABS: All Labs Reviewed:                        13.4   8.33  )-----------( 232      ( 13 Jul 2020 07:19 )             40.2                         13.5   9.86  )-----------( 255      ( 13 Jul 2020 03:22 )             41.5     13 Jul 2020 07:19    141    |  108    |  17     ----------------------------<  153    4.2     |  27     |  1.10   13 Jul 2020 03:22    141    |  108    |  19     ----------------------------<  114    3.7     |  29     |  1.20     Ca    9.1        13 Jul 2020 07:19  Ca    8.8        13 Jul 2020 03:22  Phos  1.4       13 Jul 2020 07:19  Mg     2.5       13 Jul 2020 07:19  Mg     2.2       13 Jul 2020 03:22    TPro  7.5    /  Alb  3.8    /  TBili  0.4    /  DBili  x      /  AST  19     /  ALT  26     /  AlkPhos  79     13 Jul 2020 07:19  TPro  7.8    /  Alb  4.1    /  TBili  0.3    /  DBili  x      /  AST  24     /  ALT  28     /  AlkPhos  83     13 Jul 2020 03:22    PT/INR - ( 13 Jul 2020 07:19 )   PT: 12.8 sec;   INR: 1.10 ratio         PTT - ( 13 Jul 2020 07:19 )  PTT:28.6 sec  CARDIAC MARKERS ( 13 Jul 2020 11:40 )  <0.015 ng/mL / x     / 250 U/L / x     / x      CARDIAC MARKERS ( 13 Jul 2020 03:22 )  <0.015 ng/mL / x     / 270 U/L / x     / x          Blood Culture:   07-13 @ 03:22  Pro Bnp 204    EKG:   < from: 12 Lead ECG (07.13.20 @ 10:59) >  Diagnosis Line Sinus tachycardia  Possible Left atrial enlargement  T wave abnormality, consider inferior ischemia  Abnormal ECG  When compared with ECG of 13-JUL-2020 03:21,  No significant change was found  Confirmed by NED CROSS, JUAN JOSE ([id number]) on 7/13/2020 5:06:02 PM    I personally reviewed ECG, T wave changes are nonspecific, not suggestive of ischemia.    < from: Xray Chest 1 View-PORTABLE IMMEDIATE (07.13.20 @ 04:40) >  PROCEDURE DATE:  07/13/2020          INTERPRETATION:  Chest one view    HISTORY: Shortness of breath    Radiographic examination shows the heart to be normal in size. The lungs are clear with blunting of the left costophrenic angle. There is no evidence of focal infiltrate, pneumothorax or pleural effusion.    IMPRESSION: Blunted left angle.    Further information may be obtained from the patient's subsequent CT of the chest.    [facility].      ADA ROOT M.D., ATTENDING RADIOLOGIST  This document has been electronically signed. Jul 13 2020 10:27AM    < end of copied text >

## 2020-07-13 NOTE — CONSULT NOTE ADULT - PROBLEM SELECTOR RECOMMENDATION 9
paroxysmal. CHADS2-VASC=0. On ASA for CVA prophylaxis.  Pt's BP elevated in 150-170s, may benefit from metoprolol succinate 25 mg daily for HTN & for rate control for afib episodes when they occur.

## 2020-07-13 NOTE — CONSULT NOTE ADULT - PROBLEM SELECTOR RECOMMENDATION 3
very small effusion of questionable clinical significance.  BNP minimally elevated - CHF unlikely cause of this.  Management per pulmonary.

## 2020-07-13 NOTE — H&P ADULT - RS GEN PE MLT RESP DETAILS PC
good air movement/breath sounds equal/airway patent/wheezes/intercostal retractions/respirations labored

## 2020-07-13 NOTE — ED ADULT NURSE NOTE - OBJECTIVE STATEMENT
Ambulatory from home complaining of SOB that started last evening. Patient is an avid runner but noticed that yesterday night he started needing his ProAir much more often than normal. Patient got concerned when he started to use it every two hours. Has never had more than a mild attack. Denies CP, fevers/chills, travel, sick contacts. Expiratory wheezes heard on auscultation. PMH afib taking 1 baby ASA daily but no other anticoagulation. 18g PIV inserted to L AC, labs drawn and sent, medicated as ordered. EKG in progress. MD Antonio at bedside. Safety maintained, needs attended, will continue to monitor.

## 2020-07-14 LAB
A1C WITH ESTIMATED AVERAGE GLUCOSE RESULT: 5.8 % — HIGH (ref 4–5.6)
ADD ON TEST-SPECIMEN IN LAB: SIGNIFICANT CHANGE UP
ANION GAP SERPL CALC-SCNC: 9 MMOL/L — SIGNIFICANT CHANGE UP (ref 5–17)
BUN SERPL-MCNC: 23 MG/DL — SIGNIFICANT CHANGE UP (ref 7–23)
CALCIUM SERPL-MCNC: 9.2 MG/DL — SIGNIFICANT CHANGE UP (ref 8.5–10.1)
CHLORIDE SERPL-SCNC: 106 MMOL/L — SIGNIFICANT CHANGE UP (ref 96–108)
CHOLEST SERPL-MCNC: 196 MG/DL — SIGNIFICANT CHANGE UP (ref 10–199)
CO2 SERPL-SCNC: 25 MMOL/L — SIGNIFICANT CHANGE UP (ref 22–31)
CREAT SERPL-MCNC: 1.23 MG/DL — SIGNIFICANT CHANGE UP (ref 0.5–1.3)
ESTIMATED AVERAGE GLUCOSE: 120 MG/DL — HIGH (ref 68–114)
GLUCOSE SERPL-MCNC: 147 MG/DL — HIGH (ref 70–99)
HDLC SERPL-MCNC: 57 MG/DL — SIGNIFICANT CHANGE UP
LIPID PNL WITH DIRECT LDL SERPL: 127 MG/DL — HIGH
MAGNESIUM SERPL-MCNC: 2.4 MG/DL — SIGNIFICANT CHANGE UP (ref 1.6–2.6)
PHOSPHATE SERPL-MCNC: 3.6 MG/DL — SIGNIFICANT CHANGE UP (ref 2.5–4.5)
POTASSIUM SERPL-MCNC: 4.5 MMOL/L — SIGNIFICANT CHANGE UP (ref 3.5–5.3)
POTASSIUM SERPL-SCNC: 4.5 MMOL/L — SIGNIFICANT CHANGE UP (ref 3.5–5.3)
SODIUM SERPL-SCNC: 140 MMOL/L — SIGNIFICANT CHANGE UP (ref 135–145)
TOTAL CHOLESTEROL/HDL RATIO MEASUREMENT: 3.5 RATIO — SIGNIFICANT CHANGE UP (ref 3.4–9.6)
TRIGL SERPL-MCNC: 60 MG/DL — SIGNIFICANT CHANGE UP (ref 10–149)
TSH SERPL-MCNC: 0.28 UU/ML — LOW (ref 0.34–4.82)

## 2020-07-14 PROCEDURE — 99232 SBSQ HOSP IP/OBS MODERATE 35: CPT

## 2020-07-14 PROCEDURE — 93010 ELECTROCARDIOGRAM REPORT: CPT

## 2020-07-14 PROCEDURE — 99233 SBSQ HOSP IP/OBS HIGH 50: CPT

## 2020-07-14 RX ADMIN — Medication 40 MILLIGRAM(S): at 05:04

## 2020-07-14 RX ADMIN — Medication 30 MILLIGRAM(S): at 00:12

## 2020-07-14 RX ADMIN — Medication 40 MILLIGRAM(S): at 00:11

## 2020-07-14 RX ADMIN — Medication 30 MILLIGRAM(S): at 05:04

## 2020-07-14 RX ADMIN — ALBUTEROL 2 PUFF(S): 90 AEROSOL, METERED ORAL at 08:07

## 2020-07-14 RX ADMIN — Medication 40 MILLIGRAM(S): at 10:51

## 2020-07-14 RX ADMIN — ALBUTEROL 2 PUFF(S): 90 AEROSOL, METERED ORAL at 00:22

## 2020-07-14 NOTE — PROGRESS NOTE ADULT - ASSESSMENT
O2 as needed.  IV solumedrol.  inhaled albuterol and ipratropium.  montelukast p.o.    telemetry. cardiology following.    repeat CXR with L decubitus CXR in 1-2 days re L pleural effusion.

## 2020-07-14 NOTE — PROGRESS NOTE ADULT - PROBLEM SELECTOR PLAN 3
very small effusion of questionable clinical significance.  BNP minimally elevated, EF normal w/ no evidence of diastolic dysfunction. Unlikely due to cardiac causes.  Management per pulmonary.

## 2020-07-14 NOTE — PROGRESS NOTE ADULT - SUBJECTIVE AND OBJECTIVE BOX
HPI:  61 y/o M with PMH of a-fib, asthma, p/w SOB and wheezing. Patient states he developed SOB, wheezing and tightness in his chest 2 days ago. He went to medicenter and was given inhaler, which he has been using. It provides him with only some mild temporary relief, and has gotten progressively worse. Patient denies runny nose, sore throat, nausea, vomiting, abdominal pain, CP. Patient states that he feels improved since coming to ED. Denies sick contacts and denies exposure to COVID19, states he has been quarantining during pandemic.      Patient states he used to see a pulmonologist Dr AB Cross, 8 years ago (was on inhalers at that time), but hasn't since one since, and doesn't take any medications. Denies h/o asthma exacerbation requiring hospitalization or intubation previously.     had exposure to a cat 2 days ago (is allergic).  Is a nonsmoker.     7/13: tightness/breathing improving. is wheezing.  slight cough, slight clear sputum. denies fever or chills.   7/14: tightness and breathing improved.  awake, alert.  no distress.     PSH: Shoulder surgery    Social Hx: Denies x 3    Family Hx: Mother - breast cancer (13 Jul 2020 05:43)      PAST MEDICAL & SURGICAL HISTORY:  Atrial fibrillation, unspecified type.  h.o. PAF followed by cardiology.   Mild intermittent asthma with acute exacerbation  No significant past surgical history      MEDICATIONS  (STANDING):  ALBUTerol    90 MICROgram(s) HFA Inhaler 2 Puff(s) Inhalation every 4 hours  albuterol/ipratropium for Nebulization 3 milliLiter(s) Nebulizer every 6 hours  azithromycin   Tablet 500 milliGRAM(s) Oral daily  guaiFENesin  milliGRAM(s) Oral every 12 hours  ipratropium 17 MICROgram(s) HFA Inhaler 1 Puff(s) Inhalation every 6 hours  methylPREDNISolone sodium succinate Injectable 40 milliGRAM(s) IV Push every 6 hours  pantoprazole    Tablet 40 milliGRAM(s) Oral before breakfast  sodium phosphate IVPB 15 milliMole(s) IV Intermittent once    MEDICATIONS  (PRN):  ALBUTerol    90 MICROgram(s) HFA Inhaler 1 Puff(s) Inhalation every 2 hours PRN Shortness of Breath and/or Wheezing      Allergies    No Known Allergies    Intolerances        SOCIAL HISTORY: Denies tobacco, etoh abuse or illicit drug use    FAMILY HISTORY:  No pertinent family history in first degree relatives      Vital Signs Last 24 Hrs  T(C): 36.9 (13 Jul 2020 12:21), Max: 37.2 (13 Jul 2020 10:48)  T(F): 98.5 (13 Jul 2020 12:21), Max: 99 (13 Jul 2020 10:48)  HR: 102 (13 Jul 2020 12:21) (102 - 115)  BP: 154/82 (13 Jul 2020 12:21) (136/78 - 177/102)  BP(mean): 107 (13 Jul 2020 10:48) (88 - 120)  RR: 19 (13 Jul 2020 12:21) (13 - 21)  SpO2: 97% (13 Jul 2020 12:21) (93% - 100%)    REVIEW OF SYSTEMS:    CONSTITUTIONAL:  As per HPI.  HEENT:  Eyes:  No diplopia or blurred vision. ENT:  No earache, sore throat or runny nose.  CARDIOVASCULAR:  see above.  RESPIRATORY:  see above.   GASTROINTESTINAL:  No nausea, vomiting or diarrhea.  GENITOURINARY:  No dysuria, frequency or urgency.  MUSCULOSKELETAL:  As per HPI.  SKIN:  No change in skin, hair or nails.  NEUROLOGIC:  No paresthesias, fasciculations, seizures or weakness.  PSYCHIATRIC:  No disorder of thought or mood.  ENDOCRINE:  No heat or cold intolerance, polyuria or polydipsia.  HEMATOLOGICAL:  No easy bruising or bleedings:  .     PHYSICAL EXAMINATION:    GENERAL APPEARANCE:  Pt. is not currently dyspneic, in no distress. Pt. is alert, oriented, and pleasant.  HEENT:  Pupils are normal and react normally. No icterus. Mucous membranes well colored.  NECK:  Supple. No lymphadenopathy. Jugular venous pressure not elevated. Carotids equal.   HEART:  .   CHEST:  low pitched expir wheeze with forced expir manuever.  ABDOMEN:  Soft and nontender.   EXTREMITIES:  There is no cyanosis, clubbing or edema.   SKIN:  No rash or significant lesions are noted.  Neuro: Alert, awake, and O x 3.      LABS:                        13.4   8.33  )-----------( 232      ( 13 Jul 2020 07:19 )             40.2     07-13    141  |  108  |  17  ----------------------------<  153<H>  4.2   |  27  |  1.10    Ca    9.1      13 Jul 2020 07:19  Phos  1.4     07-13  Mg     2.5     07-13    TPro  7.5  /  Alb  3.8  /  TBili  0.4  /  DBili  x   /  AST  19  /  ALT  26  /  AlkPhos  79  07-13    LIVER FUNCTIONS - ( 13 Jul 2020 07:19 )  Alb: 3.8 g/dL / Pro: 7.5 gm/dL / ALK PHOS: 79 U/L / ALT: 26 U/L / AST: 19 U/L / GGT: x           PT/INR - ( 13 Jul 2020 07:19 )   PT: 12.8 sec;   INR: 1.10 ratio         PTT - ( 13 Jul 2020 07:19 )  PTT:28.6 sec  CARDIAC MARKERS ( 13 Jul 2020 11:40 )  <0.015 ng/mL / x     / 250 U/L / x     / x      CARDIAC MARKERS ( 13 Jul 2020 03:22 )  <0.015 ng/mL / x     / 270 U/L / x     / x            ABG - ( 13 Jul 2020 06:21 )  pH, Arterial: 7.41  pH, Blood: x     /  pCO2: 37    /  pO2: 85    / HCO3: 23    / Base Excess: -.9   /  SaO2: 96            EXAM:  US DPLX LWR EXT VEINS COMPL BI                            PROCEDURE DATE:  07/13/2020          INTERPRETATION:  CLINICAL INFORMATION: Elevated d-dimer.    COMPARISON: None available.    TECHNIQUE: Duplex sonography of the BILATERAL LOWER extremity veins with color and spectral Doppler, with and without compression.      FINDINGS:    There is normal compressibility of the bilateral common femoral, femoral and popliteal veins.   Doppler examination shows normal spontaneous and phasic flow.    No calf vein thrombosis is detected. Prominent caliber of a muscular branch to the gastrocnemius on the right.    IMPRESSION:   No evidence of deep venous thrombosis in either lower extremity.                RADIOLOGY & ADDITIONAL STUDIES:       EXAM:  CTA CHEST PE PROTOCOL (W)AW IC                            PROCEDURE DATE:  07/13/2020          INTERPRETATION:  CLINICAL INFORMATION: Shortness of breath, elevated d-dimer and hypoxia. Asthma.    COMPARISON: None.    PROCEDURE:   CT Angiography of the Chest.  90 ml of Omnipaque 350 was injected intravenously. 10 ml were discarded.  Sagittal and coronal reformats were performed as well as 3D (MIP) reconstructions.    FINDINGS:    LUNGS AND AIRWAYS: Patent central airways. No pulmonary nodules, masses or consolidation. Left upper lobe linear atelectasis. Left upper lobe calcified granuloma.  PLEURA: Small left pleural effusion.  MEDIASTINUM AND ROSY: No lymphadenopathy.  VESSELS: No pulmonary embolism in the main, lobar or segmental pulmonary arteries. Evaluation of the subsegmental pulmonary arteries are limited secondary to motion artifact.  HEART: Heart size is normal. No pericardial effusion.  CHEST WALL AND LOWER NECK: Within normal limits.  VISUALIZED UPPER ABDOMEN: Within normal limits.  BONES: Degenerative changes of the spine.    IMPRESSION:   1. No pulmonary embolism in the main, lobar or segmental pulmonary arteries. Evaluation of the subsegmental pulmonary arteries are limited secondary to motion artifact.  2. Smallleft pleural effusion.

## 2020-07-14 NOTE — PROGRESS NOTE ADULT - ASSESSMENT
HPI:  61 y/o M with PMH of a-fib on ASA, asthma admitted on 7/13/20 with 2 days history of SOB,  wheezing, cough    *Asthma Exacerbation ruled out PE, COVID PCR negative   * Small left pleural effusion  - IV solumedrol 40 q6h--> 40 q8h with PPI, Zithromax   - inhalers atrovent q6h, albuterol prn due to tachycardia   - c/w mucolytics, incentive spirometry, montelukast   - tele monitoring, 2 d echo EF wnl  - pulmonary and cardiology evaluation  * Falsely Elevated d-dimers ruled out PE/DVT  * Paroxysmal A fib with low CHADS score on ASA  * Abnormal ekg with T changes in anterior leads  * Low TSH, free T4 wnl  - start Cardizem 30 q6h --> cardizem    -  2 d echo, serial troponin, ekg - wnl  - check A1C, lipid profile - pending  - cardiology evaluation  - repeat TSH in 4 weeks  * Hypophosphatemia, resolved- replace, monitor  * HTN - Cardizem     *Advance Directives  -Patient states health care proxy is his wife Mona. Discussed cardiac resuscitation and intubation / mechanical ventilation, patient wishes to have both if necessary, and is Full Code.     *DVT ppx  -SCDs     Dispo - IV steroids taper, check O2 on ambulation, d/c planning for nancy , tele - HR in 120-140

## 2020-07-14 NOTE — PROGRESS NOTE ADULT - SUBJECTIVE AND OBJECTIVE BOX
59 y/o M with PMH of a-fib, asthma presents w/ wheezing & tightness in chest x 2 days.  Admitted for asthma exacerbation.  Reports  wheezing, tightness in chest.  Given inhaler at an  urgent care which provided only minimal relief.  Started on steroids & nebs & antibiotics w/ improvement in symptoms.  currently at baseline.  Reports h/o paroxysmal afib x yrs.  CHADS2-VASc=0 episodes 1-2 a year lasting hrs.  Followed by a cardiologist not in this area.  No chest pain, wt gain, PND, orthopnea, LE edema, palpitations, recent afib episodes or other cardiac symptoms.      MEDICATIONS:  MEDICATIONS  (STANDING):  ALBUTerol    90 MICROgram(s) HFA Inhaler 2 Puff(s) Inhalation every 4 hours  azithromycin   Tablet 500 milliGRAM(s) Oral daily  diltiazem    Tablet 30 milliGRAM(s) Oral every 6 hours  guaiFENesin  milliGRAM(s) Oral every 12 hours  ipratropium 17 MICROgram(s) HFA Inhaler 1 Puff(s) Inhalation every 6 hours  methylPREDNISolone sodium succinate Injectable 40 milliGRAM(s) IV Push every 6 hours  montelukast 10 milliGRAM(s) Oral daily  pantoprazole    Tablet 40 milliGRAM(s) Oral before breakfast    MEDICATIONS  (PRN):  ALBUTerol    90 MICROgram(s) HFA Inhaler 1 Puff(s) Inhalation every 2 hours PRN Shortness of Breath and/or Wheezing    Vital Signs Last 24 Hrs  T(C): 36.7 (14 Jul 2020 08:25), Max: 37.2 (13 Jul 2020 10:48)  T(F): 98 (14 Jul 2020 08:25), Max: 99 (13 Jul 2020 10:48)  HR: 99 (14 Jul 2020 08:25) (95 - 116)  BP: 137/64 (14 Jul 2020 08:25) (130/59 - 171/86)  BP(mean): 107 (13 Jul 2020 10:48) (107 - 107)  RR: 18 (14 Jul 2020 08:25) (18 - 20)  SpO2: 95% (14 Jul 2020 08:25) (95% - 97%)    I&O's Summary      PHYSICAL EXAM:    Constitutional: NAD, awake and alert, well-developed  HEENT: PERR, EOMI,  No oral cyananosis.  Neck:  supple,  No JVD  Respiratory: Breath sounds are clear bilaterally, wheezing at bases posteriorly, no rales or rhonchi  Cardiovascular: S1 and S2, regular rate and rhythm, no Murmurs, gallops or rubs  Gastrointestinal: Bowel Sounds present, soft, nontender.   Extremities: No peripheral edema. No clubbing or cyanosis.  Vascular: 2+ peripheral pulses  Neurological: A/O x 3, no focal deficits        LABS: All Labs Reviewed:                        13.4   8.33  )-----------( 232      ( 13 Jul 2020 07:19 )             40.2                         13.5   9.86  )-----------( 255      ( 13 Jul 2020 03:22 )             41.5     13 Jul 2020 07:19    141    |  108    |  17     ----------------------------<  153    4.2     |  27     |  1.10   13 Jul 2020 03:22    141    |  108    |  19     ----------------------------<  114    3.7     |  29     |  1.20     Ca    9.1        13 Jul 2020 07:19  Ca    8.8        13 Jul 2020 03:22  Phos  1.4       13 Jul 2020 07:19  Mg     2.5       13 Jul 2020 07:19  Mg     2.2       13 Jul 2020 03:22    TPro  7.5    /  Alb  3.8    /  TBili  0.4    /  DBili  x      /  AST  19     /  ALT  26     /  AlkPhos  79     13 Jul 2020 07:19  TPro  7.8    /  Alb  4.1    /  TBili  0.3    /  DBili  x      /  AST  24     /  ALT  28     /  AlkPhos  83     13 Jul 2020 03:22    PT/INR - ( 13 Jul 2020 07:19 )   PT: 12.8 sec;   INR: 1.10 ratio         PTT - ( 13 Jul 2020 07:19 )  PTT:28.6 sec  CARDIAC MARKERS ( 13 Jul 2020 19:35 )  <0.015 ng/mL / x     / 238 U/L / x     / x      CARDIAC MARKERS ( 13 Jul 2020 11:40 )  <0.015 ng/mL / x     / 250 U/L / x     / x      CARDIAC MARKERS ( 13 Jul 2020 03:22 )  <0.015 ng/mL / x     / 270 U/L / x     / x          Blood Culture:   07-13 @ 03:22  Pro Bnp 204    =======================================  EKG:   < from: 12 Lead ECG (07.13.20 @ 10:59) >  Diagnosis Line Sinus tachycardia  Possible Left atrial enlargement  T wave abnormality, consider inferior ischemia  Abnormal ECG  When compared with ECG of 13-JUL-2020 03:21,  No significant change was found  Confirmed by JUAN JOSE MARINO MD ([id number]) on 7/13/2020 5:06:02 PM    I personally reviewed ECG, T wave changes are nonspecific, not suggestive of ischemia.    < from: Xray Chest 1 View-PORTABLE IMMEDIATE (07.13.20 @ 04:40) >  PROCEDURE DATE:  07/13/2020        =======================================  INTERPRETATION:  Chest one view    HISTORY: Shortness of breath    Radiographic examination shows the heart to be normal in size. The lungs are clear with blunting of the left costophrenic angle. There is no evidence of focal infiltrate, pneumothorax or pleural effusion.    IMPRESSION: Blunted left angle.    Further information may be obtained from the patient's subsequent CT of the chest.    Thanku for this referral.    =======================================    < from: TTE Echo Complete w/o Contrast w/ Doppler (07.13.20 @ 21:52) >   Findings     Impression     Summary   Normal appearing left atrium.   Estimated left ventricular ejection fraction is 60-65 %.   The left ventricle is normal in size, wall thickness, wall motion and   contractility as seenin limited views.   Mild mitral regurgitation is present.   Mild tricuspid valve regurgitation is present. Normal PA systolic   pressures.  =======================================

## 2020-07-14 NOTE — PROGRESS NOTE ADULT - PROBLEM SELECTOR PLAN 2
T wave inversions are nonspecific, not due to ischemia.  Echo w/ normal EF, no RWMA.  No further workup.

## 2020-07-14 NOTE — PROGRESS NOTE ADULT - PROBLEM SELECTOR PLAN 1
paroxysmal. CHADS2-VASC=0. On ASA for CVA prophylaxis.  Pt's BP elevated in 150-170s yesterday & today at 5a consider d/cing diltiazem & starting metoprolol succinate 25 mg daily for HTN & for rate control for afib episodes when they occur.

## 2020-07-15 ENCOUNTER — TRANSCRIPTION ENCOUNTER (OUTPATIENT)
Age: 60
End: 2020-07-15

## 2020-07-15 VITALS
OXYGEN SATURATION: 94 % | DIASTOLIC BLOOD PRESSURE: 72 MMHG | SYSTOLIC BLOOD PRESSURE: 128 MMHG | HEART RATE: 101 BPM | TEMPERATURE: 98 F | RESPIRATION RATE: 18 BRPM

## 2020-07-15 PROCEDURE — 99233 SBSQ HOSP IP/OBS HIGH 50: CPT

## 2020-07-15 PROCEDURE — 99239 HOSP IP/OBS DSCHRG MGMT >30: CPT

## 2020-07-15 RX ORDER — MOMETASONE FUROATE 50 UG/1
2 SPRAY NASAL
Qty: 1 | Refills: 0
Start: 2020-07-15 | End: 2020-08-13

## 2020-07-15 RX ORDER — ASPIRIN/CALCIUM CARB/MAGNESIUM 324 MG
1 TABLET ORAL
Qty: 30 | Refills: 0
Start: 2020-07-15 | End: 2020-08-13

## 2020-07-15 RX ORDER — ASPIRIN/CALCIUM CARB/MAGNESIUM 324 MG
81 TABLET ORAL DAILY
Refills: 0 | Status: DISCONTINUED | OUTPATIENT
Start: 2020-07-15 | End: 2020-07-15

## 2020-07-15 RX ORDER — DILTIAZEM HCL 120 MG
1 CAPSULE, EXT RELEASE 24 HR ORAL
Qty: 30 | Refills: 0
Start: 2020-07-15 | End: 2020-08-13

## 2020-07-15 RX ORDER — ALBUTEROL 90 UG/1
2 AEROSOL, METERED ORAL
Qty: 1 | Refills: 0
Start: 2020-07-15 | End: 2020-08-13

## 2020-07-15 RX ORDER — MONTELUKAST 4 MG/1
1 TABLET, CHEWABLE ORAL
Qty: 30 | Refills: 0
Start: 2020-07-15 | End: 2020-08-13

## 2020-07-15 RX ORDER — IPRATROPIUM BROMIDE 0.2 MG/ML
1 SOLUTION, NON-ORAL INHALATION
Qty: 1 | Refills: 0
Start: 2020-07-15 | End: 2020-08-13

## 2020-07-15 RX ORDER — PANTOPRAZOLE SODIUM 20 MG/1
1 TABLET, DELAYED RELEASE ORAL
Qty: 30 | Refills: 0
Start: 2020-07-15 | End: 2020-08-13

## 2020-07-15 NOTE — PROGRESS NOTE ADULT - PROBLEM SELECTOR PROBLEM 1
Atrial fibrillation, unspecified type
Atrial fibrillation, unspecified type
Asthma with acute exacerbation, unspecified asthma severity, unspecified whether persistent
Asthma with acute exacerbation, unspecified asthma severity, unspecified whether persistent

## 2020-07-15 NOTE — DISCHARGE NOTE PROVIDER - NSDCMRMEDTOKEN_GEN_ALL_CORE_FT
aspirin 81 mg oral tablet, chewable: 1 tab(s) orally once a day  dilTIAZem 120 mg/24 hours oral capsule, extended release: 1 cap(s) orally once a day  guaiFENesin 600 mg oral tablet, extended release: 1 tab(s) orally every 12 hours  ipratropium CFC free 17 mcg/inh inhalation aerosol: 1 puff(s) inhaled every 6 hours  montelukast 10 mg oral tablet: 1 tab(s) orally once a day  Nasonex 50 mcg/inh nasal spray: 2 spray(s) in each affected nostril once a day   pantoprazole 40 mg oral delayed release tablet: 1 tab(s) orally once a day (before a meal)  predniSONE 20 mg oral tablet: 1 tab(s) orally 3 times a day for 3 days , than twice a day for 3 days and once daily for 3 days  1/2 tab for 3 days  ProAir HFA 90 mcg/inh inhalation aerosol: 2 puff(s) inhaled every 4 hours, As Needed -for bronchospasm

## 2020-07-15 NOTE — PROGRESS NOTE ADULT - SUBJECTIVE AND OBJECTIVE BOX
HPI:  61 y/o M with PMH of a-fib, asthma, p/w SOB and wheezing. Patient states he developed SOB, wheezing and tightness in his chest 2 days ago. He went to [facility]enter and was given inhaler, which he has been using. It provides him with only some mild temporary relief, and has gotten progressively worse. Patient denies runny nose, sore throat, nausea, vomiting, abdominal pain, CP. Patient states that he feels improved since coming to ED. Denies sick contacts and denies exposure to COVID19, states he has been quarantining during pandemic.      Patient states he used to see a pulmonologist Dr AB Cross, 8 years ago (was on inhalers at that time), but hasn't since one since, and doesn't take any medications. Denies h/o asthma exacerbation requiring hospitalization or intubation previously.     had exposure to a cat 2 days ago (is allergic).  Is a nonsmoker.     7/13: tightness/breathing improving. is wheezing.  slight cough, slight clear sputum. denies fever or chills.   7/14: tightness and breathing improved.  awake, alert.  no distress.   7/15: awake, alert, comfortable, no distress.     PSH: Shoulder surgery    Social Hx: Denies x 3    Family Hx: Mother - breast cancer (13 Jul 2020 05:43)      PAST MEDICAL & SURGICAL HISTORY:  Atrial fibrillation, unspecified type.  h.o. PAF followed by cardiology.   Mild intermittent asthma with acute exacerbation  No significant past surgical history      MEDICATIONS  (STANDING):  ALBUTerol    90 MICROgram(s) HFA Inhaler 2 Puff(s) Inhalation every 4 hours  albuterol/ipratropium for Nebulization 3 milliLiter(s) Nebulizer every 6 hours  azithromycin   Tablet 500 milliGRAM(s) Oral daily  guaiFENesin  milliGRAM(s) Oral every 12 hours  ipratropium 17 MICROgram(s) HFA Inhaler 1 Puff(s) Inhalation every 6 hours  methylPREDNISolone sodium succinate Injectable 40 milliGRAM(s) IV Push every 6 hours  pantoprazole    Tablet 40 milliGRAM(s) Oral before breakfast  sodium phosphate IVPB 15 milliMole(s) IV Intermittent once    MEDICATIONS  (PRN):  ALBUTerol    90 MICROgram(s) HFA Inhaler 1 Puff(s) Inhalation every 2 hours PRN Shortness of Breath and/or Wheezing      Allergies    No Known Allergies    Intolerances        SOCIAL HISTORY: Denies tobacco, etoh abuse or illicit drug use    FAMILY HISTORY:  No pertinent family history in first degree relatives      Vital Signs Last 24 Hrs  T(C): 36.9 (13 Jul 2020 12:21), Max: 37.2 (13 Jul 2020 10:48)  T(F): 98.5 (13 Jul 2020 12:21), Max: 99 (13 Jul 2020 10:48)  HR: 102 (13 Jul 2020 12:21) (102 - 115)  BP: 154/82 (13 Jul 2020 12:21) (136/78 - 177/102)  BP(mean): 107 (13 Jul 2020 10:48) (88 - 120)  RR: 19 (13 Jul 2020 12:21) (13 - 21)  SpO2: 97% (13 Jul 2020 12:21) (93% - 100%)    REVIEW OF SYSTEMS:    CONSTITUTIONAL:  As per HPI.  HEENT:  Eyes:  No diplopia or blurred vision. ENT:  No earache, sore throat or runny nose.  CARDIOVASCULAR:  see above.  RESPIRATORY:  see above.   GASTROINTESTINAL:  No nausea, vomiting or diarrhea.  GENITOURINARY:  No dysuria, frequency or urgency.  MUSCULOSKELETAL:  As per HPI.  SKIN:  No change in skin, hair or nails.  NEUROLOGIC:  No paresthesias, fasciculations, seizures or weakness.  PSYCHIATRIC:  No disorder of thought or mood.  ENDOCRINE:  No heat or cold intolerance, polyuria or polydipsia.  HEMATOLOGICAL:  No easy bruising or bleedings:  .     PHYSICAL EXAMINATION:    GENERAL APPEARANCE:  Pt. is not currently dyspneic, in no distress. Pt. is alert, oriented, and pleasant.  HEENT:  Pupils are normal and react normally. No icterus. Mucous membranes well colored.  NECK:  Supple. No lymphadenopathy. Jugular venous pressure not elevated. Carotids equal.   HEART:  .   CHEST:  Clear to A.  Low pitched expir wheeze present with forced expir manuever.  ABDOMEN:  Soft and nontender.   EXTREMITIES:  There is no cyanosis, clubbing or edema.   SKIN:  No rash or significant lesions are noted.  Neuro: Alert, awake, and O x 3.      LABS:                        13.4   8.33  )-----------( 232      ( 13 Jul 2020 07:19 )             40.2     07-13    141  |  108  |  17  ----------------------------<  153<H>  4.2   |  27  |  1.10    Ca    9.1      13 Jul 2020 07:19  Phos  1.4     07-13  Mg     2.5     07-13    TPro  7.5  /  Alb  3.8  /  TBili  0.4  /  DBili  x   /  AST  19  /  ALT  26  /  AlkPhos  79  07-13    LIVER FUNCTIONS - ( 13 Jul 2020 07:19 )  Alb: 3.8 g/dL / Pro: 7.5 gm/dL / ALK PHOS: 79 U/L / ALT: 26 U/L / AST: 19 U/L / GGT: x           PT/INR - ( 13 Jul 2020 07:19 )   PT: 12.8 sec;   INR: 1.10 ratio         PTT - ( 13 Jul 2020 07:19 )  PTT:28.6 sec  CARDIAC MARKERS ( 13 Jul 2020 11:40 )  <0.015 ng/mL / x     / 250 U/L / x     / x      CARDIAC MARKERS ( 13 Jul 2020 03:22 )  <0.015 ng/mL / x     / 270 U/L / x     / x            ABG - ( 13 Jul 2020 06:21 )  pH, Arterial: 7.41  pH, Blood: x     /  pCO2: 37    /  pO2: 85    / HCO3: 23    / Base Excess: -.9   /  SaO2: 96            EXAM:  US DPLX LWR EXT VEINS COMPL BI                            PROCEDURE DATE:  07/13/2020          INTERPRETATION:  CLINICAL INFORMATION: Elevated d-dimer.    COMPARISON: None available.    TECHNIQUE: Duplex sonography of the BILATERAL LOWER extremity veins with color and spectral Doppler, with and without compression.      FINDINGS:    There is normal compressibility of the bilateral common femoral, femoral and popliteal veins.   Doppler examination shows normal spontaneous and phasic flow.    No calf vein thrombosis is detected. Prominent caliber of a muscular branch to the gastrocnemius on the right.    IMPRESSION:   No evidence of deep venous thrombosis in either lower extremity.                RADIOLOGY & ADDITIONAL STUDIES:       EXAM:  CTA CHEST PE PROTOCOL (W)AW IC                            PROCEDURE DATE:  07/13/2020          INTERPRETATION:  CLINICAL INFORMATION: Shortness of breath, elevated d-dimer and hypoxia. Asthma.    COMPARISON: None.    PROCEDURE:   CT Angiography of the Chest.  90 ml of Omnipaque 350 was injected intravenously. 10 ml were discarded.  Sagittal and coronal reformats were performed as well as 3D (MIP) reconstructions.    FINDINGS:    LUNGS AND AIRWAYS: Patent central airways. No pulmonary nodules, masses or consolidation. Left upper lobe linear atelectasis. Left upper lobe calcified granuloma.  PLEURA: Small left pleural effusion.  MEDIASTINUM AND ROSY: No lymphadenopathy.  VESSELS: No pulmonary embolism in the main, lobar or segmental pulmonary arteries. Evaluation of the subsegmental pulmonary arteries are limited secondary to motion artifact.  HEART: Heart size is normal. No pericardial effusion.  CHEST WALL AND LOWER NECK: Within normal limits.  VISUALIZED UPPER ABDOMEN: Within normal limits.  BONES: Degenerative changes of the spine.    IMPRESSION:   1. No pulmonary embolism in the main, lobar or segmental pulmonary arteries. Evaluation of the subsegmental pulmonary arteries are limited secondary to motion artifact.  2. Smallleft pleural effusion.

## 2020-07-15 NOTE — DISCHARGE NOTE PROVIDER - HOSPITAL COURSE
59 y/o M with PMH of a-fib on ASA, asthma admitted on 7/13/20 with 2 days history of SOB and wheezing. Patient states he developed SOB, wheezing and tightness in his chest 2 days ago. He went to [facility] and was given inhaler, which he has been using. It provides him with only some mild temporary relief, and has gotten progressively worse. Patient denies cough, runny nose, sore throat, nausea, vomiting, abdominal pain, CP. Patient states that he feels improved since coming to ED. Denies sick contacts and denies exposure to COVID19, states he has been quarantining during pandemic  Patient states he used to see a pulmonologist 8 years ago, but hasn't since one since, and doesn't take any medications. Denies h/o asthma exacerbation requiring hospitalization or intubation previously     PSH: Shoulder surgery    Social Hx: Denies x 3    Family Hx: Mother - breast cancer         7/13/20 - Patient seen and examined at bedside earlier today, denies cp, + dyspnea, wheezing, tightness in the chest , afebrile, + cough    7/14 - pt seen and examined, denies cp, cough and dyspnea improved, denies palpitations, dizziness, afebrile, POC discussed     7/15 -pt seen and examined , denies cp, cough improved, tele - no events, POC discussed     Review of system- Rest of the review of system are negative except mentioned in HPI    T(C): 36.6 (07-15-20 @ 08:54), Max: 36.8 (07-14-20 @ 17:10)    T(F): 97.9 (07-15-20 @ 08:54), Max: 98.3 (07-14-20 @ 17:10)    HR: 101 (07-15-20 @ 08:54) (84 - 101)    BP: 128/72 (07-15-20 @ 08:54) (127/68 - 141/63)    RR: 18 (07-15-20 @ 08:54) (18 - 18)    SpO2: 94% (07-15-20 @ 08:54) (94% - 95%)    Wt(kg): --    HEAD:  Atraumatic, Normocephalic    EYES: EOMI, PERRLA, conjunctiva and sclera clear    HEENT: Moist mucous membranes    NECK: Supple, No JVD    CHEST/LUNG: BS decreased over left base, No rales, no rhonchi, + expiratory wheezing    HEART: tachycardic  No murmurs, no rubs or gallops    ABDOMEN: Soft, Nontender, Nondistended; Bowel sounds present    GENITOURINARY- Voiding, no suprapubic tenderness    EXTREMITIES:  2+ Peripheral Pulses, No clubbing, cyanosis,   edema    MUSCULOSKELETAL:- No muscle tenderness, Muscle tone normal, No joint tenderness, no Joint swelling,  Joint ROM -normal    SKIN-no rash, no lesion    59 y/o M with PMH of a-fib on ASA, asthma admitted on 7/13/20 with 2 days history of SOB,  wheezing, cough        *Asthma Exacerbation ruled out PE, COVID PCR negative     * Small left pleural effusion    - IV solumedrol 40 q6h--> 40 q8h --> po taper with PPI, s/p Zithromax     - inhalers atrovent q6h, albuterol prn due to tachycardia     - c/w mucolytics, incentive spirometry, montelukast     - tele monitoring, 2 d echo EF wnl    - pulmonary and cardiology evaluation    * Falsely Elevated d-dimers ruled out PE/DVT    * Paroxysmal A fib with low CHADS score on ASA    * Abnormal ekg with T changes in anterior leads    * Low TSH, free T4 wnl    - start Cardizem 30 q6h --> cardizem      -  2 d echo, serial troponin, ekg - wnl    - check A1C, lipid profile - pending    - cardiology evaluation    - repeat TSH in 4 weeks    * Hypophosphatemia, resolved- replace, monitor    * HTN - Cardizem     * Prediabetes - A1C 5.8    * Hyperlipidemia -  - diet         *Advance Directives    -Patient states health care proxy is his wife Mona. Discussed cardiac resuscitation and intubation / mechanical ventilation, patient wishes to     Disposition - medically optimized to be discharged home with close follow up with PCP, pulmonologist within 1 week     return to ED if fever, abdominal pain, nausea, vomiting, chest pain, dyspnea    Discharge plan discussed with patient, RN    Patient advised to follow up with PCP within 3-7 days    time spend 40 min    Discharge note faxed to PCP with my contact information to call me back

## 2020-07-15 NOTE — PROGRESS NOTE ADULT - SUBJECTIVE AND OBJECTIVE BOX
PCP:    REQUESTING PHYSICIAN:    REASON FOR CONSULT:    CHIEF COMPLAINT:    HPI:59 y/o M with PMH of a-fib, asthma presents w/ wheezing & tightness in chest x 2 days.  Admitted for asthma exacerbation.  Reports  wheezing, tightness in chest.  Given inhaler at an  urgent care which provided only minimal relief.  Started on steroids & nebs & antibiotics w/ improvement in symptoms.  currently at baseline.  Reports h/o paroxysmal afib x yrs.  CHADS2-VASc=0 episodes 1-2 a year lasting hrs.  Followed by a cardiologist not in this area.  No chest pain, wt gain, PND, orthopnea, LE edema, palpitations, recent afib episodes or other cardiac symptoms.  7/15/20 Feels improved. Reports palpitations after inhaler. No chest pain. No arrhythmia. Sinus tach 90s    PSH: Shoulder surgery    Social Hx: Denies x 3    Family Hx: Mother - breast cancer (13 Jul 2020 05:43)      PAST MEDICAL & SURGICAL HISTORY:  Atrial fibrillation, unspecified type  Mild intermittent asthma with acute exacerbation  No significant past surgical history      SOCIAL HISTORY:    FAMILY HISTORY:  No pertinent family history in first degree relatives      ALLERGIES:  Allergies    No Known Allergies    Intolerances        MEDICATIONS:    MEDICATIONS  (STANDING):  aspirin  chewable 81 milliGRAM(s) Oral daily  azithromycin   Tablet 500 milliGRAM(s) Oral daily  diltiazem    milliGRAM(s) Oral daily  guaiFENesin  milliGRAM(s) Oral every 12 hours  ipratropium 17 MICROgram(s) HFA Inhaler 1 Puff(s) Inhalation every 6 hours  methylPREDNISolone sodium succinate Injectable 40 milliGRAM(s) IV Push every 8 hours  montelukast 10 milliGRAM(s) Oral daily  pantoprazole    Tablet 40 milliGRAM(s) Oral before breakfast    MEDICATIONS  (PRN):  ALBUTerol    90 MICROgram(s) HFA Inhaler 2 Puff(s) Inhalation every 4 hours PRN Shortness of Breath and/or Wheezing        Vital Signs Last 24 Hrs  T(C): 36.6 (15 Jul 2020 08:54), Max: 36.8 (14 Jul 2020 17:10)  T(F): 97.9 (15 Jul 2020 08:54), Max: 98.3 (14 Jul 2020 17:10)  HR: 101 (15 Jul 2020 08:54) (84 - 101)  BP: 128/72 (15 Jul 2020 08:54) (127/68 - 141/63)  BP(mean): --  RR: 18 (15 Jul 2020 08:54) (18 - 18)  SpO2: 94% (15 Jul 2020 08:54) (94% - 95%)Daily     Daily I&O's Summary      PHYSICAL EXAM:    Constitutional: NAD, awake and alert, well-developed  HEENT: PERR, EOMI,  No oral cyananosis.  Neck:  supple,  No JVD  Respiratory: Breath sounds are clear bilaterally, No wheezing, rales or rhonchi  Cardiovascular: S1 and S2, regular rate and rhythm, no Murmurs, gallops or rubs  Gastrointestinal: Bowel Sounds present, soft, nontender.   Extremities: No peripheral edema. No clubbing or cyanosis.  Vascular: 2+ peripheral pulses  Neurological: A/O x 3, no focal deficits  Musculoskeletal: no calf tenderness.  Skin: No rashes.      LABS: All Labs Reviewed:                        13.4   8.33  )-----------( 232      ( 13 Jul 2020 07:19 )             40.2                         13.5   9.86  )-----------( 255      ( 13 Jul 2020 03:22 )             41.5     14 Jul 2020 08:13    140    |  106    |  23     ----------------------------<  147    4.5     |  25     |  1.23   13 Jul 2020 07:19    141    |  108    |  17     ----------------------------<  153    4.2     |  27     |  1.10   13 Jul 2020 03:22    141    |  108    |  19     ----------------------------<  114    3.7     |  29     |  1.20     Ca    9.2        14 Jul 2020 08:13  Ca    9.1        13 Jul 2020 07:19  Ca    8.8        13 Jul 2020 03:22  Phos  3.6       14 Jul 2020 08:13  Phos  1.4       13 Jul 2020 07:19  Mg     2.4       14 Jul 2020 08:13  Mg     2.5       13 Jul 2020 07:19  Mg     2.2       13 Jul 2020 03:22    TPro  7.5    /  Alb  3.8    /  TBili  0.4    /  DBili  x      /  AST  19     /  ALT  26     /  AlkPhos  79     13 Jul 2020 07:19  TPro  7.8    /  Alb  4.1    /  TBili  0.3    /  DBili  x      /  AST  24     /  ALT  28     /  AlkPhos  83     13 Jul 2020 03:22      CARDIAC MARKERS ( 13 Jul 2020 19:35 )  <0.015 ng/mL / x     / 238 U/L / x     / x          Blood Culture:   07-13 @ 03:22  Pro Bnp 204    07-14 @ 08:13  TSH: 0.28      RADIOLOGY/EKG:      ECHO/CARDIAC CATHTERIZATION/STRESS TEST:  < from: TTE Echo Complete w/o Contrast w/ Doppler (07.13.20 @ 21:52) >  Impression     Summary     Estimated left ventricular ejection fraction is 60-65 %.   The left ventricle is normal in size, wall thickness, wall motion and   contractility as seenin limited views.   Mild mitral regurgitation is present.   Mild tricuspid valve regurgitation is present. Normal PA systolic   pressures.     Signature     ----------------------------------------------------------------   Electronically signed by Dusty Riley MD(Interpreting   physician) on 07/14/2020 06:27 AM   ----------------------------------------------------------------    < end of copied text >

## 2020-07-15 NOTE — DISCHARGE NOTE PROVIDER - CARE PROVIDER_API CALL
Amy Corley  INTERNAL MEDICINE  [address] [place] [address]  Phone: [phone]  Fax: [phone]  Follow Up Time: 1-3 days    Pablo Rice  INTERNAL MEDICINE  [facility] [place] [address]  Phone: [phone]  Fax: [phone]  Follow Up Time: 1 week    Manish Freeman  CARDIOVASCULAR DISEASE  [address] [place] [address]  Phone: [phone]  Fax: [phone]  Follow Up Time: 1 week

## 2020-07-15 NOTE — DISCHARGE NOTE PROVIDER - NSDCCPCAREPLAN_GEN_ALL_CORE_FT
PRINCIPAL DISCHARGE DIAGNOSIS  Diagnosis: Asthma with acute exacerbation, unspecified asthma severity, unspecified whether persistent  Assessment and Plan of Treatment: complete taper of steroids as instructed, follow up with PCP and pulmonologist within1  week   use inhalers as instructed, rescue inhaler as needed      SECONDARY DISCHARGE DIAGNOSES  Diagnosis: Low serum thyroid stimulating hormone (TSH)  Assessment and Plan of Treatment: repeat TSH in 4 weeks    Diagnosis: Hyperlipidemia  Assessment and Plan of Treatment:  , low fat diet    Diagnosis: Prediabetes  Assessment and Plan of Treatment: A1C 5.8 , diet low in carbs, repeat A1C in 4 weeks    Diagnosis: PAF (paroxysmal atrial fibrillation)  Assessment and Plan of Treatment: take aspirin 81, follow up with cardiologist within 2 weeks    Diagnosis: Pleural effusion, left  Assessment and Plan of Treatment: repeat CXR in 1-2 weeks to establish resolution

## 2020-07-15 NOTE — PROGRESS NOTE ADULT - ASSESSMENT
O2 as needed.  IV solumedrol, can change to p.o. prednisone with gradual taper.  inhaled albuterol and ipratropium.  montelukast p.o.   If pt is d/c'ed home today, have him see me in office w/in 1 week.     telemetry. cardiology following.    will repeat CXR with L decubitus CXR as outpatient.

## 2020-07-15 NOTE — DISCHARGE NOTE PROVIDER - NSDCFUADDINST_GEN_ALL_CORE_FT
LUNGS AND AIRWAYS: Patent central airways. No pulmonary nodules, masses or consolidation. Left upper lobe linear atelectasis. Left upper lobe calcified granuloma.  PLEURA: Small left pleural effusion.  MEDIASTINUM AND ROSY: No lymphadenopathy.  VESSELS: No pulmonary embolism in the main, lobar or segmental pulmonary arteries. Evaluation of the subsegmental pulmonary arteries are limited secondary to motion artifact.  HEART: Heart size is normal. No pericardial effusion.  CHEST WALL AND LOWER NECK: Within normal limits.  VISUALIZED UPPER ABDOMEN: Within normal limits.  BONES: Degenerative changes of the spine.    IMPRESSION:   1. No pulmonary embolism in the main, lobar or segmental pulmonary arteries. Evaluation of the subsegmental pulmonary arteries are limited secondary to motion artifact.  2. Smallleft pleural effusion.      < end of copied text >    A1C with Estimated Average Glucose in AM (07.14.20 @ 08:13)    A1C with Estimated Average Glucose Result: 5.8: Method: Immunoassay       Reference Range                4.0-5.6%       High risk (prediabetic)        5.7-6.4%       Diabetic, diagnostic             >=6.5%       ADA diabetic treatment goal       <7.0%  The Hemoglobin A1c testing is NGSP-certified.Reference ranges are based  upon the 2010 recommendations of  the American Diabetes Association.  Interpretation may vary for children  and adolescents. %    Estimated Average Glucose: 120: The Estimated Average Glucose (eAG) or Mean Plasma Glucose (MPG) value is  calculated from the hemoglobin A1c value and covers the same time period.   The American Diabetes Association (ADA) and other professional  organizations recommend reporting the eAG with the HgbA1c. mg/dL  Lipid Profile in AM (07.14.20 @ 08:13)    Total Cholesterol/HDL Ratio Measurement: 3.5 RATIO    Cholesterol, Serum: 196 mg/dL    Triglycerides, Serum: 60 mg/dL    HDL Cholesterol, Serum: 57: HDL Levels >/= 60 mg/dL are considered beneficial and a "negative" risk  factor.  Effective 08/15/2018: New reference range and interpretive comment. mg/dL    Direct LDL: 127: LDL Cholesterol (mg/dL) --- Interpretive Comment (for adults 18 and over)  Optimal LDL Level may vary based on clinical situation  Below 70                   Ideal for people at very high risk of heart  disease  Below 100                  Ideal for people at risk of heart disease  100 - 129                    [place]  130 - 159                    Borderline high  160 - 189                    High  190 and Above           Very high mg/dL

## 2020-07-15 NOTE — DISCHARGE NOTE PROVIDER - CARE PROVIDERS DIRECT ADDRESSES
,DirectAddress_Unknown,[email or web address]

## 2020-07-15 NOTE — PROGRESS NOTE ADULT - PROBLEM SELECTOR PLAN 1
paroxysmal. CHADS2-VASC=0. On ASA for CVA prophylaxis.  Blood pressure improved on current meds D/C planned today.

## 2020-07-15 NOTE — DISCHARGE NOTE NURSING/CASE MANAGEMENT/SOCIAL WORK - PATIENT PORTAL LINK FT
You can access the FollowMyHealth Patient Portal offered by [facility] by registering at the following website: [email or web address]. By joining [employer]’s FollowMyHealth portal, you will also be able to view your health information using other applications (apps) compatible with our system.

## 2020-07-15 NOTE — PROGRESS NOTE ADULT - PROBLEM SELECTOR PROBLEM 3
Atrial fibrillation, unspecified type
Atrial fibrillation, unspecified type
Pleural effusion on left
Pleural effusion on left

## 2020-07-17 DIAGNOSIS — Z79.82 LONG TERM (CURRENT) USE OF ASPIRIN: ICD-10-CM

## 2020-07-17 DIAGNOSIS — I10 ESSENTIAL (PRIMARY) HYPERTENSION: ICD-10-CM

## 2020-07-17 DIAGNOSIS — R94.31 ABNORMAL ELECTROCARDIOGRAM [ECG] [EKG]: ICD-10-CM

## 2020-07-17 DIAGNOSIS — R73.03 PREDIABETES: ICD-10-CM

## 2020-07-17 DIAGNOSIS — Z82.49 FAMILY HISTORY OF ISCHEMIC HEART DISEASE AND OTHER DISEASES OF THE CIRCULATORY SYSTEM: ICD-10-CM

## 2020-07-17 DIAGNOSIS — R07.89 OTHER CHEST PAIN: ICD-10-CM

## 2020-07-17 DIAGNOSIS — Z11.59 ENCOUNTER FOR SCREENING FOR OTHER VIRAL DISEASES: ICD-10-CM

## 2020-07-17 DIAGNOSIS — E83.39 OTHER DISORDERS OF PHOSPHORUS METABOLISM: ICD-10-CM

## 2020-07-17 DIAGNOSIS — I48.0 PAROXYSMAL ATRIAL FIBRILLATION: ICD-10-CM

## 2020-07-17 DIAGNOSIS — J45.901 UNSPECIFIED ASTHMA WITH (ACUTE) EXACERBATION: ICD-10-CM

## 2020-07-17 DIAGNOSIS — E78.5 HYPERLIPIDEMIA, UNSPECIFIED: ICD-10-CM

## 2020-07-17 DIAGNOSIS — J90 PLEURAL EFFUSION, NOT ELSEWHERE CLASSIFIED: ICD-10-CM

## 2020-07-18 PROBLEM — I48.91 UNSPECIFIED ATRIAL FIBRILLATION: Chronic | Status: ACTIVE | Noted: 2020-07-13

## 2020-07-18 PROBLEM — J45.21 MILD INTERMITTENT ASTHMA WITH (ACUTE) EXACERBATION: Chronic | Status: ACTIVE | Noted: 2020-07-13

## 2020-07-18 RX ORDER — AZITHROMYCIN 500 MG/1
2 TABLET, FILM COATED ORAL
Qty: 10 | Refills: 0
Start: 2020-07-18 | End: 2020-07-22

## 2020-07-18 NOTE — CHART NOTE - NSCHARTNOTEFT_GEN_A_CORE
pt left the message that he having a lot of congestions, coughing spells, talked to patient   denies chest pain, dyspnea only with cough, some palpitations,    advised to see pulmonologist in next 1-3 days , z-pack send to pharmacy

## 2020-07-24 ENCOUNTER — APPOINTMENT (OUTPATIENT)
Dept: PULMONOLOGY | Facility: CLINIC | Age: 60
End: 2020-07-24

## 2020-07-29 ENCOUNTER — APPOINTMENT (OUTPATIENT)
Dept: INTERNAL MEDICINE | Facility: CLINIC | Age: 60
End: 2020-07-29
Payer: COMMERCIAL

## 2020-07-29 VITALS
RESPIRATION RATE: 18 BRPM | BODY MASS INDEX: 28.99 KG/M2 | HEIGHT: 72 IN | HEART RATE: 112 BPM | SYSTOLIC BLOOD PRESSURE: 140 MMHG | WEIGHT: 214 LBS | OXYGEN SATURATION: 97 % | TEMPERATURE: 98.4 F | DIASTOLIC BLOOD PRESSURE: 80 MMHG

## 2020-07-29 PROCEDURE — 99495 TRANSJ CARE MGMT MOD F2F 14D: CPT

## 2020-07-29 RX ORDER — MONTELUKAST 10 MG/1
10 TABLET, FILM COATED ORAL
Qty: 90 | Refills: 1 | Status: ACTIVE | COMMUNITY
Start: 2020-07-29 | End: 1900-01-01

## 2020-07-29 NOTE — PHYSICAL EXAM
[No Acute Distress] : no acute distress [Well Nourished] : well nourished [Well Developed] : well developed [Well-Appearing] : well-appearing [PERRL] : pupils equal round and reactive to light [Normal Sclera/Conjunctiva] : normal sclera/conjunctiva [Normal Outer Ear/Nose] : the outer ears and nose were normal in appearance [Normal Oropharynx] : the oropharynx was normal [No JVD] : no jugular venous distention [No Lymphadenopathy] : no lymphadenopathy [Supple] : supple [No Respiratory Distress] : no respiratory distress  [No Accessory Muscle Use] : no accessory muscle use [Normal Rate] : normal rate  [Normal S1, S2] : normal S1 and S2 [No Edema] : there was no peripheral edema [Soft] : abdomen soft [No Extremity Clubbing/Cyanosis] : no extremity clubbing/cyanosis [Non Tender] : non-tender [Non-distended] : non-distended [No HSM] : no HSM [Normal Bowel Sounds] : normal bowel sounds [Normal Anterior Cervical Nodes] : no anterior cervical lymphadenopathy [No Rash] : no rash [No Focal Deficits] : no focal deficits [Normal Affect] : the affect was normal [Normal Insight/Judgement] : insight and judgment were intact [de-identified] : few scattered wheezes [de-identified] : HR 90

## 2020-07-29 NOTE — REVIEW OF SYSTEMS
[Fever] : no fever [Chills] : no chills [Chest Pain] : no chest pain [Dizziness] : no dizziness [Fainting] : no fainting [Negative] : Heme/Lymph [FreeTextEntry5] : see above [FreeTextEntry6] : see above

## 2020-07-29 NOTE — ASSESSMENT
[FreeTextEntry1] : #1.  Recent hospitalization for asthma exacerbation.  Pt states he is feeling much better now.  Will go on montelukast 10 mg p.o. daily and Qvar 80, 2 puffs twice daily with gargle after.  Has not needed his rescue pro-air inhaler recently, but if needed he may try 1 puff every 6 hours as needed.  \par \par #2  Atrial fibrillation.  Continue current meds.  Continue close following with Dr. Bell, cardiologist in [place].\par \par #3  h.o. recent clot seen on Doppler of the left forearm.  Pt is on Xarelto.  Will see hematologist tomorrow, Dr Brannon. \par \par #4  History of small left pleural effusion seen on CT scan of chest during recent hospitalization.  Patient will have a PA and lateral and left decubitus chest x-ray.\par \par Pt states he will follow with previous pulmonologist, Dr. AMARI Rockwell.

## 2020-07-29 NOTE — HEALTH RISK ASSESSMENT
[] : No [Yes] : Yes [0] : 2) Feeling down, depressed, or hopeless: Not at all (0) [Smoke Detector] : smoke detector [Carbon Monoxide Detector] : carbon monoxide detector [Guns at Home] : no guns at home [Seat Belt] :  uses seat belt [Sunscreen] : uses sunscreen [ColonoscopyDate] : 01/15 [ColonoscopyComments] : virtual

## 2020-07-29 NOTE — REVIEW OF SYSTEMS
[Fever] : no fever [Chills] : no chills [Chest Pain] : no chest pain [Dizziness] : no dizziness [Fainting] : no fainting [Negative] : Heme/Lymph [FreeTextEntry6] : see above [FreeTextEntry5] : see above

## 2020-07-29 NOTE — HISTORY OF PRESENT ILLNESS
[Post-hospitalization from ___ Hospital] : Post-hospitalization from [unfilled] Hospital [Admitted on: ___] : The patient was admitted on [unfilled] [Discharged on ___] : discharged on [unfilled] [FreeTextEntry2] : This is a 60-year-old male who was recently released from [facility] after being treated for asthma exacerbation.  He has a history of atrial fibrillation and is being followed by Dr. Bell cardiology [place].  Patient tells me his asthma is doing well recently and has only slight wheezing and no coughing.  He has had that his breathing has been fine.  He is not having any sputum production.  Patient tells me he is not taking any inhaled medicine or montelukast p.o.  Recently finished his prednisone taper.\par \par Tells me he had a Doppler ultrasound of the left forearm by his primary Dr Benjamin in [place] that showed a clot and he was started on Xarelto 5 days ago.  He is due to see Dr. Brannon, hematologist, tomorrow.

## 2020-07-29 NOTE — PHYSICAL EXAM
[Well Nourished] : well nourished [No Acute Distress] : no acute distress [Well Developed] : well developed [Well-Appearing] : well-appearing [PERRL] : pupils equal round and reactive to light [Normal Sclera/Conjunctiva] : normal sclera/conjunctiva [Normal Outer Ear/Nose] : the outer ears and nose were normal in appearance [Normal Oropharynx] : the oropharynx was normal [No JVD] : no jugular venous distention [No Lymphadenopathy] : no lymphadenopathy [No Respiratory Distress] : no respiratory distress  [Supple] : supple [No Accessory Muscle Use] : no accessory muscle use [Normal Rate] : normal rate  [Normal S1, S2] : normal S1 and S2 [No Edema] : there was no peripheral edema [Soft] : abdomen soft [No Extremity Clubbing/Cyanosis] : no extremity clubbing/cyanosis [Non Tender] : non-tender [Non-distended] : non-distended [No HSM] : no HSM [Normal Bowel Sounds] : normal bowel sounds [Normal Anterior Cervical Nodes] : no anterior cervical lymphadenopathy [No Rash] : no rash [No Focal Deficits] : no focal deficits [Normal Insight/Judgement] : insight and judgment were intact [Normal Affect] : the affect was normal [de-identified] : few scattered wheezes [de-identified] : HR 90

## 2020-07-29 NOTE — PHYSICAL EXAM
[Well Nourished] : well nourished [No Acute Distress] : no acute distress [Well Developed] : well developed [Well-Appearing] : well-appearing [Normal Sclera/Conjunctiva] : normal sclera/conjunctiva [PERRL] : pupils equal round and reactive to light [Normal Outer Ear/Nose] : the outer ears and nose were normal in appearance [Normal Oropharynx] : the oropharynx was normal [No JVD] : no jugular venous distention [No Lymphadenopathy] : no lymphadenopathy [Supple] : supple [No Respiratory Distress] : no respiratory distress  [No Accessory Muscle Use] : no accessory muscle use [Normal Rate] : normal rate  [Normal S1, S2] : normal S1 and S2 [No Edema] : there was no peripheral edema [Soft] : abdomen soft [No Extremity Clubbing/Cyanosis] : no extremity clubbing/cyanosis [Non Tender] : non-tender [Non-distended] : non-distended [No HSM] : no HSM [Normal Bowel Sounds] : normal bowel sounds [Normal Anterior Cervical Nodes] : no anterior cervical lymphadenopathy [No Rash] : no rash [No Focal Deficits] : no focal deficits [Normal Affect] : the affect was normal [Normal Insight/Judgement] : insight and judgment were intact [de-identified] : few scattered wheezes [de-identified] : HR 90

## 2020-07-29 NOTE — ASSESSMENT
[FreeTextEntry1] : #1.  Recent hospitalization for asthma exacerbation.  Pt states he is feeling much better now.  Will go on montelukast 10 mg p.o. daily and Qvar 80, 2 puffs twice daily with gargle after.  Has not needed his rescue pro-air inhaler recently, but if needed he may try 1 puff every 6 hours as needed.  \par \par #2  Atrial fibrillation.  Continue current meds.  Continue close following with Dr. Bell, cardiologist in [place].\par \par #3  h.o. recent clot seen on Doppler of the left forearm.  Pt is on Xarelto.  Will see hematologist tomorrow, Dr Brannon. \par \par #4  History of small left pleural effusion seen on CT scan of chest during recent hospitalization.  Patient will have a PA and lateral and left decubitus chest x-ray.\par \par Pt states he will follow with previous pulmonologist, Dr. AMARI Rockwell.

## 2020-07-29 NOTE — HISTORY OF PRESENT ILLNESS
[Post-hospitalization from ___ Hospital] : Post-hospitalization from [unfilled] Hospital [Admitted on: ___] : The patient was admitted on [unfilled] [Discharged on ___] : discharged on [unfilled] [FreeTextEntry2] : This is a 60-year-old male who was recently released from [facility] after being treated for asthma exacerbation.  He has a history of atrial fibrillation and is being followed by Dr. Bell cardiology [place].  Patient tells me his asthma is doing well recently and has only slight wheezing and no coughing.  He has had that his breathing has been fine.  He is not having any sputum production.  Patient tells me he is not taking any inhaled medicine or montelukast p.o.  Recently finished his prednisone taper.\par \par Tells me he had a Doppler ultrasound of the left forearm by his primary Dr Benjamin in [place] that showed a clot and he was started on Xarelto 5 days ago.  He is due to see Dr. Brannon, hematologist, tomorrow.

## 2020-07-29 NOTE — HISTORY OF PRESENT ILLNESS
[Post-hospitalization from ___ Hospital] : Post-hospitalization from [unfilled] Hospital [Admitted on: ___] : The patient was admitted on [unfilled] [Discharged on ___] : discharged on [unfilled] [FreeTextEntry2] : This is a 60-year-old male who was recently released from [facility] after being treated for asthma exacerbation.  He has a history of atrial fibrillation and is being followed by Dr. Bell cardiology [place].  Patient tells me his asthma is doing well recently and has only slight wheezing and no coughing.  He has had that his breathing has been fine.  He is not having any sputum production.  Patient tells me he is not taking any inhaled medicine or montelukast p.o.  Recently finished his prednisone taper.\par \par Tells me he had a Doppler ultrasound of the left forearm by his primary Dr Benjamin in [place] that showed a clot and he was started on Xarelto 5 days ago.  He is due to see Dr. Brannon, hematologist, tomorrow.

## 2020-08-04 ENCOUNTER — APPOINTMENT (OUTPATIENT)
Dept: PULMONOLOGY | Facility: CLINIC | Age: 60
End: 2020-08-04

## 2020-10-26 ENCOUNTER — APPOINTMENT (OUTPATIENT)
Dept: PULMONOLOGY | Facility: CLINIC | Age: 60
End: 2020-10-26
Payer: COMMERCIAL

## 2020-10-26 VITALS
SYSTOLIC BLOOD PRESSURE: 135 MMHG | RESPIRATION RATE: 17 BRPM | HEIGHT: 72 IN | OXYGEN SATURATION: 97 % | HEART RATE: 103 BPM | BODY MASS INDEX: 29.12 KG/M2 | WEIGHT: 215 LBS | TEMPERATURE: 97.3 F | DIASTOLIC BLOOD PRESSURE: 80 MMHG

## 2020-10-26 DIAGNOSIS — Z82.49 FAMILY HISTORY OF ISCHEMIC HEART DISEASE AND OTHER DISEASES OF THE CIRCULATORY SYSTEM: ICD-10-CM

## 2020-10-26 DIAGNOSIS — Z82.0 FAMILY HISTORY OF EPILEPSY AND OTHER DISEASES OF THE NERVOUS SYSTEM: ICD-10-CM

## 2020-10-26 DIAGNOSIS — Z86.2 PERSONAL HISTORY OF DISEASES OF THE BLOOD AND BLOOD-FORMING ORGANS AND CERTAIN DISORDERS INVOLVING THE IMMUNE MECHANISM: ICD-10-CM

## 2020-10-26 DIAGNOSIS — Z80.3 FAMILY HISTORY OF MALIGNANT NEOPLASM OF BREAST: ICD-10-CM

## 2020-10-26 DIAGNOSIS — Z87.09 PERSONAL HISTORY OF OTHER DISEASES OF THE RESPIRATORY SYSTEM: ICD-10-CM

## 2020-10-26 DIAGNOSIS — Z78.9 OTHER SPECIFIED HEALTH STATUS: ICD-10-CM

## 2020-10-26 DIAGNOSIS — Z86.79 PERSONAL HISTORY OF OTHER DISEASES OF THE CIRCULATORY SYSTEM: ICD-10-CM

## 2020-10-26 PROCEDURE — G0008: CPT

## 2020-10-26 PROCEDURE — 99072 ADDL SUPL MATRL&STAF TM PHE: CPT

## 2020-10-26 PROCEDURE — 90682 RIV4 VACC RECOMBINANT DNA IM: CPT

## 2020-10-26 PROCEDURE — 71046 X-RAY EXAM CHEST 2 VIEWS: CPT

## 2020-10-26 PROCEDURE — 99204 OFFICE O/P NEW MOD 45 MIN: CPT | Mod: 25

## 2020-10-26 RX ORDER — OLOPATADINE HYDROCHLORIDE 665 UG/1
0.6 SPRAY, METERED NASAL
Qty: 3 | Refills: 1 | Status: ACTIVE | COMMUNITY
Start: 2020-10-26 | End: 1900-01-01

## 2020-10-26 RX ORDER — LEVALBUTEROL TARTRATE 45 UG/1
45 AEROSOL, METERED ORAL
Qty: 3 | Refills: 2 | Status: ACTIVE | COMMUNITY
Start: 2020-10-26 | End: 1900-01-01

## 2020-10-26 RX ORDER — ASPIRIN ENTERIC COATED TABLETS 81 MG 81 MG/1
81 TABLET, DELAYED RELEASE ORAL
Refills: 0 | Status: ACTIVE | COMMUNITY

## 2020-10-26 NOTE — PROCEDURE
[FreeTextEntry1] : \par CT (7.13.2020) no evidence of pulmonary embolism; small left pleural effusion; CAMPBELL granuloma\par \par CBC reveals hgb 18.4, hct 30.2, plt 30.2, 12.5% eos (1230)\par

## 2020-10-26 NOTE — ADDENDUM
[FreeTextEntry1] : Documented by Amol Fairbanks acting as a scribe for Dr. Jasbir Rockwell on 10/26/2020.\par \par All medical record entries made by the Scribe were at my, Dr. Jasbir Rockwell's, direction and personally dictated by me on 10/26/2020 . I have reviewed the chart and agree that the record accurately reflects my personal performance of the history, physical exam, assessment and plan. I have also personally directed, reviewed, and agree with the discharge instructions. \par

## 2020-10-26 NOTE — ASSESSMENT
[FreeTextEntry1] : Mr. BENAVIDES is a 60 year old male with a history of ?remote sarcoidosis, asthma, (eosinophilic asthma), allergies, OW,  A Fib (PAF)   who now comes to the office for an initial pulmonary evaluation. s/p Hospitalization for allergic reaction/ asthma \par \par His shortness of breath is multifactorial due to:\par -poor mechanics of breathing \par -out of shape / overweight\par -pulmonary disease\par - Eosinophilic asthma, allergies/ sinus, ?remote sarcoidosis, left pleural effusion, r/o MIGUEL\par -?cardiac disease \par \par Problem 1: Eosinophilic Asthma\par -Candidate for Biologics (Fasenra, Nucala, Dupixent) \par -(on hold) Qvar Redihaler 80 2 puffs BID\par -add Trelegy 1 puff QD\par -add Xopenex 0.63 via nebulizer q6H prn\par -add Singulair 10 mg before bed \par Asthma is believed to be caused by inherited (genetic) and environmental factor, but its exact cause is unknown. Asthma may be triggered by allergens, lung infections, or irritants in the air. Asthma triggers are different for each person \par  -Inhaler technique reviewed as well as oral hygiene techniques reviewed with patient. Avoidance of cold air, extremes of temperature, rescue inhaler should be used before exercise. Order of medication reviewed with patient. Recommended use of a cool mist humidifier in the bedroom. \par \par Problem 2: Allergies/ Sinus\par -get Blood work to include: asthma panel, food IgE panel, IgE level, eosinophil level, vitamin D level \par -Add Olopatadine 0.6% at 1 sniff/nostril BID \par Environmental measures for allergies were encouraged including mattress and pillow covers, air purifier, and environmental controls. \par \par Problem 3: Left Pleural Effusion (resolved)\par -Cleared on Current CXR\par \par Problem 4:? Sarcoidosis\par - no evidence clinically or radiographically\par \par Problem 5 ?MIGUEL (fatigue, snore, neck size) \par -complete home sleep study\par Sleep apnea is associated with adverse clinical consequences which can affect most organ systems. Cardiovascular disease risk includes arrhythmias, atrial fibrillation, hypertension, coronary artery disease, and stroke. Metabolic disorders include diabetes type 2, non-alcoholic fatty liver disease. Mood disorder especially depression; and cognitive decline especially in the elderly. Associations with chronic reflux/Adkins’s esophagus some but not all inclusive. \par -Reasons include arousal consistent with hypopnea; respiratory events most prominent in REM sleep or supine position; therefore sleep staging and body position are important for accurate diagnosis and estimation of AHI. \par \par Treatment options discussed including CPAP/BiPAP machine, oral appliance, ProVent therapy, Oxy-Aid by [employer], new technologies, or positional sleep.Recommended use of the CPAP machine for moderate (AHI >15), moderate to severe (AHI 15-30) and severe patients (AHI > 30). Recommended weight loss which can reduce AHI especially in weight loss of greater than 5% of BMI. Positional sleep is recommended in those with low AHI, low-moderate BMI, and younger age. For severe sleep apnea, the hypoglossal nerve stimulator was recommended as well. \par \par problem 6: cardiac disease (A Fib)\par -recommended to continue to follow up with Cardiologist ([facility]) \par \par problem 7: poor breathing mechanics\par -Proper breathing techniques were reviewed with an emphasis of exhalation. Patient instructed to breath in for 1 second and out for four seconds. Patient was encouraged to not talk while walking. \par \par problem 8: out of shape / overweight\par -Weight loss, exercise, and diet control were discussed and are highly encouraged. Treatment options were given such as, aqua therapy, and contacting a nutritionist. Recommended to use the elliptical, stationary bike, less use of treadmill. Mindful eating was explained to the patient Obesity is associated with worsening asthma, shortness of breath, and potential for cardiac disease, diabetes, and other underlying medical conditions\par \par problem 9: health maintenance \par -s/p yearly flu shot after October 2020\par -recommended strep pneumonia vaccines: Prevnar-13 vaccine, followed by Pneumo vaccine 23 one year following after 65\par -recommended early intervention for Upper Respiratory Infections (URIs)\par -recommended regular osteoporosis evaluations\par -recommended early dermatological evaluations\par -recommended after the age of 50 to the age of 70, colonoscopy every 5 years\par \par F/U in 6-8 weeks.\par He is encouraged to call with any changes, concerns, or questions\par

## 2020-10-26 NOTE — HISTORY OF PRESENT ILLNESS
[TextBox_4] : Mr. BENAVIDES is a 60 year old male presenting to the office today for initial pulmonary evaluation. His chief complaint is\par \par -he notes recent visit to [facility] for allergen induced asthma, severe case\par -he notes asthma triggers of dust, animal danger\par -he notes asthma Sx of SOB, wheeze, cough\par -he notes allergy Sx of PND, nasal congestion\par -he notes deviated septum \par -he denies sneeze\par -he notes weight increased 15 lbs\par -he notes exercising regularly \par -he notes on baby aspirin for A fib\par -he notes nocturia, 3 episodes per night\par -he notes snores\par -he notes wakes fatigued\par -he notes 8 hours of sleep a night on average\par -he denies ankle swelling\par -he notes visual issues stable after episode of peripheral flashes\par -he notes regular bowel movements\par -he notes senses of smell and taste are good \par -he notes muscle stiffness upon awakening\par -he notes on Abx Rx for current USI\par -he denies current SOB\par -he denies orthopnea\par -he denies SOB middle of the night\par -he notes he could fall asleep while watching a boring TV show \par -he notes he could fall asleep in a car\par \par \par -he denies any chest pain, chest pressure, diarrhea, constipation, dysphagia, dizziness, leg swelling, leg pain, itchy eyes, itchy ears, heartburn, reflux, sour taste in the mouth, myalgias or arthralgias.

## 2020-10-26 NOTE — PHYSICAL EXAM
[No Acute Distress] : no acute distress [Normal Oropharynx] : normal oropharynx [Normal Appearance] : normal appearance [No Neck Mass] : no neck mass [Normal Rate/Rhythm] : normal rate/rhythm [Normal S1, S2] : normal s1, s2 [No Murmurs] : no murmurs [No Resp Distress] : no resp distress [No Abnormalities] : no abnormalities [Benign] : benign [Normal Gait] : normal gait [No Clubbing] : no clubbing [No Cyanosis] : no cyanosis [No Edema] : no edema [FROM] : FROM [Normal Color/ Pigmentation] : normal color/ pigmentation [No Focal Deficits] : no focal deficits [Oriented x3] : oriented x3 [Normal Affect] : normal affect [III] : Mallampati Class: III [TextBox_68] : I:E ratio 1:3; mild expiratory wheeze

## 2020-10-26 NOTE — REASON FOR VISIT
[Initial] : an initial visit [TextBox_44] : Eosinophilic asthma, allergies/ sinus, ?remote sarcoidosis, left pleural effusion, r/o MIGUEL

## 2020-10-29 ENCOUNTER — LABORATORY RESULT (OUTPATIENT)
Age: 60
End: 2020-10-29

## 2020-10-29 LAB
25(OH)D3 SERPL-MCNC: 43.9 NG/ML
BASOPHILS # BLD AUTO: 0.06 K/UL
BASOPHILS NFR BLD AUTO: 1 %
EOSINOPHIL # BLD AUTO: 0.41 K/UL
EOSINOPHIL NFR BLD AUTO: 6.9 %
HCT VFR BLD CALC: 43.5 %
HGB BLD-MCNC: 14.1 G/DL
IMM GRANULOCYTES NFR BLD AUTO: 0.2 %
LYMPHOCYTES # BLD AUTO: 2.18 K/UL
LYMPHOCYTES NFR BLD AUTO: 36.9 %
MAN DIFF?: NORMAL
MCHC RBC-ENTMCNC: 28.3 PG
MCHC RBC-ENTMCNC: 32.4 GM/DL
MCV RBC AUTO: 87.2 FL
MONOCYTES # BLD AUTO: 0.58 K/UL
MONOCYTES NFR BLD AUTO: 9.8 %
NEUTROPHILS # BLD AUTO: 2.66 K/UL
NEUTROPHILS NFR BLD AUTO: 45.2 %
PLATELET # BLD AUTO: 225 K/UL
RBC # BLD: 4.99 M/UL
RBC # FLD: 12 %
WBC # FLD AUTO: 5.9 K/UL

## 2020-10-30 LAB — 24R-OH-CALCIDIOL SERPL-MCNC: 43.8 PG/ML

## 2020-11-03 LAB
A ALTERNATA IGE QN: <0.1 KUA/L
A FUMIGATUS IGE QN: <0.1 KUA/L
C ALBICANS IGE QN: <0.1 KUA/L
C HERBARUM IGE QN: <0.1 KUA/L
CAT DANDER IGE QN: 0.18 KUA/L
COMMON RAGWEED IGE QN: <0.1 KUA/L
D FARINAE IGE QN: 0.11 KUA/L
D PTERONYSS IGE QN: 0.12 KUA/L
DEPRECATED A ALTERNATA IGE RAST QL: 0
DEPRECATED A FUMIGATUS IGE RAST QL: 0
DEPRECATED C ALBICANS IGE RAST QL: 0
DEPRECATED C HERBARUM IGE RAST QL: 0
DEPRECATED CAT DANDER IGE RAST QL: NORMAL
DEPRECATED COMMON RAGWEED IGE RAST QL: 0
DEPRECATED D FARINAE IGE RAST QL: NORMAL
DEPRECATED D PTERONYSS IGE RAST QL: NORMAL
DEPRECATED DOG DANDER IGE RAST QL: 0
DEPRECATED M RACEMOSUS IGE RAST QL: 0
DEPRECATED ROACH IGE RAST QL: 0
DEPRECATED TIMOTHY IGE RAST QL: 0
DEPRECATED WHITE OAK IGE RAST QL: 0
DOG DANDER IGE QN: <0.1 KUA/L
M RACEMOSUS IGE QN: <0.1 KUA/L
ROACH IGE QN: <0.1 KUA/L
TIMOTHY IGE QN: <0.1 KUA/L
TOTAL IGE SMQN RAST: 44 KU/L
WHITE OAK IGE QN: <0.1 KUA/L

## 2020-11-04 LAB
CLAM IGE QN: <0.1 KUA/L
CODFISH IGE QN: <0.1 KUA/L
CORN IGE QN: <0.1 KUA/L
COW MILK IGE QN: <0.1 KUA/L
DEPRECATED CLAM IGE RAST QL: 0
DEPRECATED CODFISH IGE RAST QL: 0
DEPRECATED CORN IGE RAST QL: 0
DEPRECATED COW MILK IGE RAST QL: 0
DEPRECATED EGG WHITE IGE RAST QL: 0
DEPRECATED PEANUT IGE RAST QL: 0
DEPRECATED SCALLOP IGE RAST QL: <0.1 KUA/L
DEPRECATED SESAME SEED IGE RAST QL: 0
DEPRECATED SHRIMP IGE RAST QL: 0
DEPRECATED SOYBEAN IGE RAST QL: 0
DEPRECATED WALNUT IGE RAST QL: 0
DEPRECATED WHEAT IGE RAST QL: 0
EGG WHITE IGE QN: <0.1 KUA/L
PEANUT IGE QN: <0.1 KUA/L
SCALLOP IGE QN: 0
SCALLOP IGE QN: <0.1 KUA/L
SESAME SEED IGE QN: <0.1 KUA/L
SOYBEAN IGE QN: <0.1 KUA/L
WALNUT IGE QN: <0.1 KUA/L
WHEAT IGE QN: <0.1 KUA/L

## 2020-11-09 DIAGNOSIS — Z01.812 ENCOUNTER FOR PREPROCEDURAL LABORATORY EXAMINATION: ICD-10-CM

## 2020-11-11 ENCOUNTER — NON-APPOINTMENT (OUTPATIENT)
Age: 60
End: 2020-11-11

## 2020-11-23 ENCOUNTER — NON-APPOINTMENT (OUTPATIENT)
Age: 60
End: 2020-11-23

## 2020-12-03 ENCOUNTER — LABORATORY RESULT (OUTPATIENT)
Age: 60
End: 2020-12-03

## 2020-12-07 ENCOUNTER — APPOINTMENT (OUTPATIENT)
Dept: PULMONOLOGY | Facility: CLINIC | Age: 60
End: 2020-12-07
Payer: COMMERCIAL

## 2020-12-07 VITALS
SYSTOLIC BLOOD PRESSURE: 144 MMHG | TEMPERATURE: 98.2 F | HEART RATE: 78 BPM | BODY MASS INDEX: 29.12 KG/M2 | WEIGHT: 215 LBS | DIASTOLIC BLOOD PRESSURE: 80 MMHG | HEIGHT: 72 IN | RESPIRATION RATE: 16 BRPM | OXYGEN SATURATION: 98 %

## 2020-12-07 PROCEDURE — 95012 NITRIC OXIDE EXP GAS DETER: CPT

## 2020-12-07 PROCEDURE — 94729 DIFFUSING CAPACITY: CPT

## 2020-12-07 PROCEDURE — 99072 ADDL SUPL MATRL&STAF TM PHE: CPT

## 2020-12-07 PROCEDURE — 94727 GAS DIL/WSHOT DETER LNG VOL: CPT

## 2020-12-07 PROCEDURE — 99214 OFFICE O/P EST MOD 30 MIN: CPT | Mod: 25

## 2020-12-07 PROCEDURE — 94010 BREATHING CAPACITY TEST: CPT

## 2020-12-07 PROCEDURE — 94618 PULMONARY STRESS TESTING: CPT

## 2020-12-07 NOTE — HISTORY OF PRESENT ILLNESS
[TextBox_4] : Mr. BENAVIDES is a 60 year old male with a history of Eosinophilic asthma, allergies/ sinus, ?remote sarcoidosis, left pleural effusion, ? MIGUEL presenting to the office today for pulmonary follow up. His chief complaint is\par \par -he notes energy levels stable\par -he denies getting enough sleep, exacerbated 2-3 times per week taking care of mother\par -he notes breathing improved with Trelegy compliance\par -he notes intermittent right lower back discomfort\par -he notes abdominal spasms and cramping exacerbated by dehydration\par -he notes energy levels 8/10 on average\par -he notes weight increased exacerbated by stress\par -he notes eating well\par \par \par -denies any chest pain, chest pressure, diarrhea, constipation, dysphagia, sour taste in the mouth, dizziness, leg swelling, leg pain, myalgias, arthralgias, itchy eyes, itchy ears, heartburn, or reflux.\par \par

## 2020-12-07 NOTE — REASON FOR VISIT
[Follow-Up] : a follow-up visit [TextBox_44] : Eosinophilic asthma, allergies/ sinus, ?remote sarcoidosis, left pleural effusion, r/o MIGUEL

## 2020-12-07 NOTE — ADDENDUM
[FreeTextEntry1] : Documented by Amol Fairbanks acting as a scribe for Dr. Jasbir Rockwell on 12/07/2020.\par \par All medical record entries made by the Scribe were at my, Dr. Jasbir Rockwell's, direction and personally dictated by me on 12/07/2020 . I have reviewed the chart and agree that the record accurately reflects my personal performance of the history, physical exam, assessment and plan. I have also personally directed, reviewed, and agree with the discharge instructions. \par

## 2020-12-07 NOTE — PROCEDURE
[FreeTextEntry1] : \par Full PFT revealed moderate to severe obstructive dysfunction, with a FEV1 of 2.31 L, which is 59 % of predicted, normal lung volumes, and a diffusion of 31.3, which is 118% of predicted, with a normal flow volume loop.\par \par 6 minute walk test reveals a low saturation of 96% with no evidence of dyspnea or fatigue; walked  782.4 meters\par \par  FENO was 12 ; normal value being less than 25\par Fractional exhaled nitric oxide (FENO) is regarded as a simple, noninvasive method for assessing eosinophilic airway inflammation. Produced by a variety of cells within the lung, nitric oxide (NO) concentrations are generally low in healthy individuals. However, high concentrations of NO appear to be involved in nonspecific host defense mechanisms and chronic inflammatory diseases such as asthma. The American Thoracic Society (ATS) therefore has recommended using FENO to aid in the diagnosis and monitoring of eosinophilic airway inflammation and asthma, and for identifying steroid responsive individuals whose chronic respiratory symptoms may be airway inflammation.\par

## 2020-12-07 NOTE — PHYSICAL EXAM
[No Acute Distress] : no acute distress [Normal Oropharynx] : normal oropharynx [III] : Mallampati Class: III [Normal Appearance] : normal appearance [No Neck Mass] : no neck mass [Normal Rate/Rhythm] : normal rate/rhythm [Normal S1, S2] : normal s1, s2 [No Murmurs] : no murmurs [No Resp Distress] : no resp distress [No Abnormalities] : no abnormalities [Benign] : benign [Normal Gait] : normal gait [No Clubbing] : no clubbing [No Cyanosis] : no cyanosis [No Edema] : no edema [FROM] : FROM [Normal Color/ Pigmentation] : normal color/ pigmentation [No Focal Deficits] : no focal deficits [Oriented x3] : oriented x3 [Normal Affect] : normal affect [TextBox_68] : I:E ratio 1:3; mild end expiratory wheeze

## 2020-12-07 NOTE — ASSESSMENT
[FreeTextEntry1] : Mr. BENAVIDES is a 60 year old male with a history of ?remote sarcoidosis, asthma, (eosinophilic asthma), allergies, OW,  A Fib (PAF)   who now comes to the office for an pulmonary follow up. s/p Hospitalization for allergic reaction/ asthma  c/w eosinophilic asthma \par \par His shortness of breath is multifactorial due to:\par -poor mechanics of breathing \par -out of shape / overweight\par -pulmonary disease\par - Eosinophilic asthma, allergies/ sinus, ?remote sarcoidosis, left pleural effusion, r/o MIGUEL\par -?cardiac disease \par \par Problem 1: Asthma\par -(on hold) Qvar Redihaler 80 2 puffs BID\par -continue Trelegy 1 puff QD\par -continue Xopenex 0.63 via nebulizer q6H prn\par -continue Singulair 10 mg before bed \par Asthma is believed to be caused by inherited (genetic) and environmental factor, but its exact cause is unknown. Asthma may be triggered by allergens, lung infections, or irritants in the air. Asthma triggers are different for each person \par  -Inhaler technique reviewed as well as oral hygiene techniques reviewed with patient. Avoidance of cold air, extremes of temperature, rescue inhaler should be used before exercise. Order of medication reviewed with patient. Recommended use of a cool mist humidifier in the bedroom. \par \par Problem 1A: Eosinophilic Asthma (confirmed) \par -Candidate for Biologics (Fasenra, Nucala, Dupixent) \par \par Problem 2: Allergies/ Sinus\par -get Blood work to include: asthma panel, food IgE panel, IgE level, eosinophil level, vitamin D level \par -continue Olopatadine 0.6% at 1 sniff/nostril BID \par Environmental measures for allergies were encouraged including mattress and pillow covers, air purifier, and environmental controls. \par \par Problem 3: Left Pleural Effusion (resolved)\par -Cleared on Current CXR\par \par Problem 4:? Sarcoidosis\par - no evidence clinically or radiographically\par \par Problem 5 ?MIGUEL (fatigue, snore, neck size) \par -complete home sleep study\par Sleep apnea is associated with adverse clinical consequences which can affect most organ systems. Cardiovascular disease risk includes arrhythmias, atrial fibrillation, hypertension, coronary artery disease, and stroke. Metabolic disorders include diabetes type 2, non-alcoholic fatty liver disease. Mood disorder especially depression; and cognitive decline especially in the elderly. Associations with chronic reflux/Adkins’s esophagus some but not all inclusive. \par -Reasons include arousal consistent with hypopnea; respiratory events most prominent in REM sleep or supine position; therefore sleep staging and body position are important for accurate diagnosis and estimation of AHI. \par \par Treatment options discussed including CPAP/BiPAP machine, oral appliance, ProVent therapy, Oxy-Aid by [employer], new technologies, or positional sleep.Recommended use of the CPAP machine for moderate (AHI >15), moderate to severe (AHI 15-30) and severe patients (AHI > 30). Recommended weight loss which can reduce AHI especially in weight loss of greater than 5% of BMI. Positional sleep is recommended in those with low AHI, low-moderate BMI, and younger age. For severe sleep apnea, the hypoglossal nerve stimulator was recommended as well. \par \par problem 6: cardiac disease (A Fib)\par -recommended to continue to follow up with Cardiologist ([facility]) \par \par problem 7: poor breathing mechanics\par -Proper breathing techniques were reviewed with an emphasis of exhalation. Patient instructed to breath in for 1 second and out for four seconds. Patient was encouraged to not talk while walking. \par \par problem 8: out of shape / overweight\par -Weight loss, exercise, and diet control were discussed and are highly encouraged. Treatment options were given such as, aqua therapy, and contacting a nutritionist. Recommended to use the elliptical, stationary bike, less use of treadmill. Mindful eating was explained to the patient Obesity is associated with worsening asthma, shortness of breath, and potential for cardiac disease, diabetes, and other underlying medical conditions\par \par problem 9: health maintenance \par -s/p yearly flu shot after October 2020\par -recommended strep pneumonia vaccines: Prevnar-13 vaccine, followed by Pneumo vaccine 23 one year following after 65\par -recommended early intervention for Upper Respiratory Infections (URIs)\par -recommended regular osteoporosis evaluations\par -recommended early dermatological evaluations\par -recommended after the age of 50 to the age of 70, colonoscopy every 5 years\par \par F/U in 6-8 weeks.\par He is encouraged to call with any changes, concerns, or questions\par

## 2021-02-01 ENCOUNTER — APPOINTMENT (OUTPATIENT)
Dept: PULMONOLOGY | Facility: CLINIC | Age: 61
End: 2021-02-01

## 2021-02-12 ENCOUNTER — APPOINTMENT (OUTPATIENT)
Dept: PULMONOLOGY | Facility: CLINIC | Age: 61
End: 2021-02-12

## 2021-04-09 ENCOUNTER — APPOINTMENT (OUTPATIENT)
Dept: PULMONOLOGY | Facility: CLINIC | Age: 61
End: 2021-04-09
Payer: COMMERCIAL

## 2021-04-09 VITALS
BODY MASS INDEX: 30.34 KG/M2 | HEIGHT: 72 IN | WEIGHT: 224 LBS | HEART RATE: 87 BPM | TEMPERATURE: 97.7 F | OXYGEN SATURATION: 98 % | RESPIRATION RATE: 16 BRPM | DIASTOLIC BLOOD PRESSURE: 82 MMHG | SYSTOLIC BLOOD PRESSURE: 150 MMHG

## 2021-04-09 PROCEDURE — ZZZZZ: CPT

## 2021-04-09 PROCEDURE — 99072 ADDL SUPL MATRL&STAF TM PHE: CPT

## 2021-04-09 PROCEDURE — 99214 OFFICE O/P EST MOD 30 MIN: CPT

## 2021-04-09 NOTE — HISTORY OF PRESENT ILLNESS
[TextBox_4] : Mr. BENAVIDES is a 61 year old male with a history of Eosinophilic asthma, allergies/ sinus, ?remote sarcoidosis, left pleural effusion, ? MIGUEL presenting to the office today for pulmonary follow up. His chief complaint is\par -he notes his mother recently \par -he notes he is cleaning out his mother's house and is frequently exposed to dust (once/twice a week)\par -he notes going to the gym everyday and running 5-10 miles a day\par -he notes taking Xanax for stress (0.5mg 3 times a week) to sleep\par -he notes having trouble sleeping\par -he notes BP has been up (138/85)\par -he notes his  bowels are regular \par -he notes sense of smell and taste are normal\par - S/p Covid 19 vaccine (Pfizer) x2 3/2021\par -he notes frequent nocturia 3-4 times a night\par -he notes drinking a lot of fluids prior to going to bed\par -he notes annual PSA test is okay\par -he notes taking a multivitamin, low dose aspirin, Trelegy\par -he notes wanting to know if Trelegy contains a steroid and the number of components such as bronchodilators\par -he notes wanting to know if Trelegy can impact weight \par \par \par \par patient denies any headaches, nausea, vomiting, fever, chills, sweats, chest pain, chest pressure, palpitations, coughing, wheezing, fatigue, diarrhea, constipation, dysphagia, myalgias, dizziness, leg swelling, leg pain, itchy eyes, itchy ears, heartburn, reflux or sour taste in the mouth

## 2021-04-09 NOTE — PHYSICAL EXAM
[No Acute Distress] : no acute distress [Normal Oropharynx] : normal oropharynx [Normal Appearance] : normal appearance [No Neck Mass] : no neck mass [Normal Rate/Rhythm] : normal rate/rhythm [Normal S1, S2] : normal s1, s2 [No Murmurs] : no murmurs [No Resp Distress] : no resp distress [No Abnormalities] : no abnormalities [Benign] : benign [Normal Gait] : normal gait [No Clubbing] : no clubbing [No Cyanosis] : no cyanosis [No Edema] : no edema [FROM] : FROM [Normal Color/ Pigmentation] : normal color/ pigmentation [No Focal Deficits] : no focal deficits [Oriented x3] : oriented x3 [Normal Affect] : normal affect [II] : Mallampati Class: II [TextBox_68] : I:E ratio 1:3; mild forced expiratory wheeze

## 2021-04-09 NOTE — ASSESSMENT
[FreeTextEntry1] : Mr. BENAVIDES is a 61 year old male with a history of ?remote sarcoidosis, asthma, (eosinophilic asthma), allergies, OW,  A Fib (PAF)   who now comes to the office for an pulmonary follow up. s/p Hospitalization for allergic reaction/ asthma  c/w eosinophilic asthma \par \par His shortness of breath is multifactorial due to:\par -poor mechanics of breathing \par -out of shape / overweight\par -pulmonary disease\par - Eosinophilic asthma, allergies/ sinus, ?remote sarcoidosis, left pleural effusion, r/o MIGUEL\par -?cardiac disease \par \par Problem 1: Asthma (still) - severe persistent\par -continue Trelegy 100 1 puff QD\par -continue Xopenex 0.63 via nebulizer q6H prn\par -continue Singulair 10 mg before bed \par Asthma is believed to be caused by inherited (genetic) and environmental factor, but its exact cause is unknown. Asthma may be triggered by allergens, lung infections, or irritants in the air. Asthma triggers are different for each person \par  -Inhaler technique reviewed as well as oral hygiene techniques reviewed with patient. Avoidance of cold air, extremes of temperature, rescue inhaler should be used before exercise. Order of medication reviewed with patient. Recommended use of a cool mist humidifier in the bedroom. \par \par Problem 1A: Eosinophilic Asthma (confirmed) \par -Candidate for Biologics (Fasenra, Nucala, Dupixent) \par \par Problem 2: Allergies/ Sinus\par -s/p Blood work to include: asthma panel (+), food IgE panel (-), IgE level, eosinophil level (+), vitamin D level (WNL)\par -continue Olopatadine 0.6% at 1 sniff/nostril BID \par Environmental measures for allergies were encouraged including mattress and pillow covers, air purifier, and environmental controls. \par \par Problem 3: Left Pleural Effusion (resolved)\par -Cleared on Current CXR\par \par Problem 4:? Sarcoidosis\par - no evidence clinically or radiographically\par \par Problem 5 ?MIGUEL (fatigue, snore, neck size) \par -complete home sleep study\par Sleep apnea is associated with adverse clinical consequences which can affect most organ systems. Cardiovascular disease risk includes arrhythmias, atrial fibrillation, hypertension, coronary artery disease, and stroke. Metabolic disorders include diabetes type 2, non-alcoholic fatty liver disease. Mood disorder especially depression; and cognitive decline especially in the elderly. Associations with chronic reflux/Adkins’s esophagus some but not all inclusive. \par -Reasons include arousal consistent with hypopnea; respiratory events most prominent in REM sleep or supine position; therefore sleep staging and body position are important for accurate diagnosis and estimation of AHI. \par \par Treatment options discussed including CPAP/BiPAP machine, oral appliance, ProVent therapy, Oxy-Aid by [employer]pite[employer], new technologies, or positional sleep.Recommended use of the CPAP machine for moderate (AHI >15), moderate to severe (AHI 15-30) and severe patients (AHI > 30). Recommended weight loss which can reduce AHI especially in weight loss of greater than 5% of BMI. Positional sleep is recommended in those with low AHI, low-moderate BMI, and younger age. For severe sleep apnea, the hypoglossal nerve stimulator was recommended as well. \par \par problem 6: cardiac disease (A Fib)\par -recommended to continue to follow up with Cardiologist ([facility]) \par \par problem 7: poor breathing mechanics\par -Proper breathing techniques were reviewed with an emphasis of exhalation. Patient instructed to breath in for 1 second and out for four seconds. Patient was encouraged to not talk while walking. \par \par problem 8: out of shape / overweight\par -Weight loss, exercise, and diet control were discussed and are highly encouraged. Treatment options were given such as, aqua therapy, and contacting a nutritionist. Recommended to use the elliptical, stationary bike, less use of treadmill. Mindful eating was explained to the patient Obesity is associated with worsening asthma, shortness of breath, and potential for cardiac disease, diabetes, and other underlying medical conditions\par \par problem 9: health maintenance \par - S/p Covid 19 vaccine (Pfizer) x2 3/2021\par -s/p yearly flu shot after October 2020\par -recommended strep pneumonia vaccines: Prevnar-13 vaccine, followed by Pneumo vaccine 23 one year following after 65\par -recommended early intervention for Upper Respiratory Infections (URIs)\par -recommended regular osteoporosis evaluations\par -recommended early dermatological evaluations\par -recommended after the age of 50 to the age of 70, colonoscopy every 5 years\par \par F/U in 6-8 weeks.\par He is encouraged to call with any changes, concerns, or questions\par

## 2021-04-09 NOTE — ADDENDUM
[FreeTextEntry1] : Documented by Eddie Eduardo acting as a scribe for Dr. Jasbir Rockwell on (04/09/2021).\par \par All medical record entries made by the Scribe were at my, Dr. Jasbir Rockwell's, direction and personally dictated by me on (04/09/2021). I have reviewed the chart and agree that the record accurately reflects my personal performance of the history, physical exam, assessment and plan. I have also personally directed, reviewed, and agree with the discharge instructions.\par

## 2021-08-11 ENCOUNTER — APPOINTMENT (OUTPATIENT)
Dept: PULMONOLOGY | Facility: CLINIC | Age: 61
End: 2021-08-11

## 2021-08-31 ENCOUNTER — APPOINTMENT (OUTPATIENT)
Dept: PULMONOLOGY | Facility: CLINIC | Age: 61
End: 2021-08-31

## 2021-09-17 ENCOUNTER — NON-APPOINTMENT (OUTPATIENT)
Age: 61
End: 2021-09-17

## 2021-09-20 ENCOUNTER — RX RENEWAL (OUTPATIENT)
Age: 61
End: 2021-09-20

## 2021-10-14 NOTE — PROGRESS NOTE ADULT - SUBJECTIVE AND OBJECTIVE BOX
Pt calling for status update on refill on strips. He states he was advised Abbot test strips is covered though, but he would only like to get these from his local [facility]. He is completely out    Subjective:  Patient is a 60y old  Male who presents with a chief complaint of SOB     HPI:  61 y/o M with PMH of a-fib on ASA, asthma admitted on 7/13/20 with 2 days history of SOB and wheezing. Patient states he developed SOB, wheezing and tightness in his chest 2 days ago. He went to [facility]enter and was given inhaler, which he has been using. It provides him with only some mild temporary relief, and has gotten progressively worse. Patient denies cough, runny nose, sore throat, nausea, vomiting, abdominal pain, CP. Patient states that he feels improved since coming to ED. Denies sick contacts and denies exposure to COVID19, states he has been quarantining during pandemic  Patient states he used to see a pulmonologist 8 years ago, but hasn't since one since, and doesn't take any medications. Denies h/o asthma exacerbation requiring hospitalization or intubation previously   PSH: Shoulder surgery  Social Hx: Denies x 3  Family Hx: Mother - breast cancer     7/13/20 - Patient seen and examined at bedside earlier today, denies cp, + dyspnea, wheezing, tightness in the chest , afebrile, + cough  7/14 - pt seen and examined, denies cp, cough and dyspnea improved, denies palpitations, dizziness, afebrile, POC dicussed     Review of system- Rest of the review of system are negative except mentioned in HPI    OBJECTIVE:   T(C): 36.7 (07-14-20 @ 08:25), Max: 36.7 (07-13-20 @ 20:56)  T(F): 98 (07-14-20 @ 08:25), Max: 98.1 (07-14-20 @ 00:10)  HR: 99 (07-14-20 @ 08:25) (95 - 116)  BP: 137/64 (07-14-20 @ 08:25) (130/59 - 160/69)  RR: 18 (07-14-20 @ 08:25) (18 - 20)  SpO2: 95% (07-14-20 @ 08:25) (95% - 97%)  Wt(kg): --      PHYSICAL EXAM:  GENERAL: NAD  NERVOUS SYSTEM:  Alert & Oriented X3, non- focal exam,  Motor Strength 5/5 B/L upper and lower extremities; DTRs 2+ intact and symmetric  HEAD:  Atraumatic, Normocephalic  EYES: EOMI, PERRLA, conjunctiva and sclera clear  HEENT: Moist mucous membranes  NECK: Supple, No JVD  CHEST/LUNG: BS decreased over left base, No rales, no rhonchi, + expiratory wheezing  HEART: tachycardic  No murmurs, no rubs or gallops  ABDOMEN: Soft, Nontender, Nondistended; Bowel sounds present  GENITOURINARY- Voiding, no suprapubic tenderness  EXTREMITIES:  2+ Peripheral Pulses, No clubbing, cyanosis,   edema  MUSCULOSKELETAL:- No muscle tenderness, Muscle tone normal, No joint tenderness, no Joint swelling,  Joint ROM -normal  SKIN-no rash, no lesion    LABS: all reviewed                        13.4   8.33  )-----------( 232      ( 13 Jul 2020 07:19 )             40.2                         13.5   9.86  )-----------( 255      ( 13 Jul 2020 03:22 )             41.5       07-14    140  |  106  |  23  ----------------------------<  147<H>  4.5   |  25  |  1.23    Ca    9.2      14 Jul 2020 08:13  Phos  3.6     07-14  Mg     2.4     07-14        CARDIAC MARKERS ( 13 Jul 2020 11:40 )  <0.015 ng/mL / x     / 250 U/L / x     / x      CARDIAC MARKERS ( 13 Jul 2020 03:22 )  <0.015 ng/mL / x     / 270 U/L / x     / x        Serum Pro-Brain Natriuretic Peptide (07.13.20 @ 03:22)    Serum Pro-Brain Natriuretic Peptide: 204 pg/mL        ABG - ( 13 Jul 2020 06:21 )  pH, Arterial: 7.41  pH, Blood: x     /  pCO2: 37    /  pO2: 85    / HCO3: 23    / Base Excess: -.9   /  SaO2: 96        Thyroid Stimulating Hormone, Serum in AM (07.14.20 @ 08:13)    Thyroid Stimulating Hormone, Serum: 0.28 uU/mL    Free Thyroxine, Serum (07.14.20 @ 08:13)    Free Thyroxine, Serum: 1.28 ng/dL      RECENT CULTURES:    RADIOLOGY & ADDITIONAL TESTS: all reviewed   EKG reviewed  < from: 12 Lead ECG (07.13.20 @ 10:59) >  Ventricular Rate 121 BPM    Atrial Rate 121 BPM    P-R Interval 156 ms    QRS Duration 86 ms    Q-T Interval 314 ms    QTC Calculation(Bezet) 445 ms    P Axis 72 degrees    R Axis 71 degrees    T Axis 18 degrees    Diagnosis Line Sinus tachycardia  Possible Left atrial enlargement  T wave abnormality, consider inferior ischemia  Abnormal ECG  When compared with ECG of 13-JUL-2020 03:21,  No significant change was found    < end of copied text >  < from: US Duplex Venous Lower Ext Complete, Bilateral (07.13.20 @ 09:07) >  There is normal compressibility of the bilateral common femoral, femoral and popliteal veins.   Doppler examination shows normal spontaneous and phasic flow.    No calf vein thrombosis is detected. Prominent caliber of a muscular branch to the gastrocnemius on the right.    IMPRESSION:   No evidence of deep venous thrombosis in either lower extremity.      < end of copied text >  < from: CT Angio Chest PE Protocol w/ IV Cont (07.13.20 @ 04:44) >    LUNGS AND AIRWAYS: Patent central airways. No pulmonary nodules, masses or consolidation. Left upper lobe linear atelectasis. Left upper lobe calcified granuloma.  PLEURA: Small left pleural effusion.  MEDIASTINUM AND ROSY: No lymphadenopathy.  VESSELS: No pulmonary embolism in the main, lobar or segmental pulmonary arteries. Evaluation of the subsegmental pulmonary arteries are limited secondary to motion artifact.  HEART: Heart size is normal. No pericardial effusion.  CHEST WALL AND LOWER NECK: Within normal limits.  VISUALIZED UPPER ABDOMEN: Within normal limits.  BONES: Degenerative changes of the spine.    IMPRESSION:   1. No pulmonary embolism in the main, lobar or segmental pulmonary arteries. Evaluation of the subsegmental pulmonary arteries are limited secondary to motion artifact.  2. Smallleft pleural effusion.      < end of copied text >  < from: TTE Echo Complete w/o Contrast w/ Doppler (07.13.20 @ 21:52) >   Estimated left ventricular ejection fraction is 60-65 %.   The left ventricle is normal in size, wall thickness, wall motion and   contractility as seenin limited views.   Mild mitral regurgitation is present.   Mild tricuspid valve regurgitation is present. Normal PA systolic   pressures.    < end of copied text >          Current medications:  ALBUTerol    90 MICROgram(s) HFA Inhaler 1 Puff(s) Inhalation every 2 hours PRN  ALBUTerol    90 MICROgram(s) HFA Inhaler 2 Puff(s) Inhalation every 4 hours  azithromycin   Tablet 500 milliGRAM(s) Oral daily  diltiazem    Tablet 30 milliGRAM(s) Oral every 6 hours  guaiFENesin  milliGRAM(s) Oral every 12 hours  ipratropium 17 MICROgram(s) HFA Inhaler 1 Puff(s) Inhalation every 6 hours  methylPREDNISolone sodium succinate Injectable 40 milliGRAM(s) IV Push every 6 hours  montelukast 10 milliGRAM(s) Oral daily  pantoprazole    Tablet 40 milliGRAM(s) Oral before breakfast

## 2021-10-25 ENCOUNTER — APPOINTMENT (OUTPATIENT)
Dept: PULMONOLOGY | Facility: CLINIC | Age: 61
End: 2021-10-25
Payer: COMMERCIAL

## 2021-10-25 VITALS
HEART RATE: 81 BPM | SYSTOLIC BLOOD PRESSURE: 131 MMHG | WEIGHT: 233 LBS | BODY MASS INDEX: 31.56 KG/M2 | RESPIRATION RATE: 16 BRPM | TEMPERATURE: 97.5 F | HEIGHT: 72 IN | OXYGEN SATURATION: 98 % | DIASTOLIC BLOOD PRESSURE: 80 MMHG

## 2021-10-25 PROCEDURE — 99214 OFFICE O/P EST MOD 30 MIN: CPT | Mod: 25

## 2021-10-25 PROCEDURE — 94618 PULMONARY STRESS TESTING: CPT

## 2021-10-25 NOTE — ASSESSMENT
[FreeTextEntry1] : Mr. BENAVIDES is a 61 year old male with a history of ?remote sarcoidosis, asthma, (eosinophilic asthma), allergies, OW,  A Fib (PAF)   who now comes to the office for an pulmonary follow up. s/p Hospitalization for allergic reaction/ asthma  c/w eosinophilic asthma \par \par His shortness of breath is multifactorial due to:\par -poor mechanics of breathing \par -out of shape / overweight\par -pulmonary disease\par - Eosinophilic asthma, allergies/ sinus, ?remote sarcoidosis, left pleural effusion, r/o MIGUEL\par -?cardiac disease \par \par Problem 1: Asthma (still) - severe persistent\par -transition Trelegy 100 1 puff QD to Breztri 2 puffs BID \par -continue Xopenex 0.63 via nebulizer q6H prn\par -continue Singulair 10 mg before bed \par Asthma is believed to be caused by inherited (genetic) and environmental factor, but its exact cause is unknown. Asthma may be triggered by allergens, lung infections, or irritants in the air. Asthma triggers are different for each person \par  -Inhaler technique reviewed as well as oral hygiene techniques reviewed with patient. Avoidance of cold air, extremes of temperature, rescue inhaler should be used before exercise. Order of medication reviewed with patient. Recommended use of a cool mist humidifier in the bedroom. \par \par Problem 1A: Eosinophilic Asthma (confirmed) \par -recheck eosinophil level\par -Candidate for Biologics (Fasenra, Nucala, Dupixent) \par -The safety and efficacy of Nucala was established in three double-blind, randomized, placebo-controlled trials in patients with severe asthma. Compared to a placebo, patients with severe asthma receiving Nucala had fewer exacerbation requiring hospitalization and/or emergency department visits, and a longer time to first exacerbation. In addition, patients with severe asthma receiving Nucala or Fasenra experienced greater reductions in their daily maintenance oral corticosteroid dose, while maintaining asthma control compared with patients receiving placebo. Treatment with Nucala did not result in a significant improvement in lung function, as measured by the volume of air exhaled by patients in one second. The most common side effects include: headache, injection site reactions, back pain, weakness, and fatigue; hypersensitivity reactions can occur within hours or days including swelling of the face, mouth, and tongue, fainting, dizziness, hives, breathing problems, and rash; herpes zoster infections have occurred. The drug is a monoclonal antibody that inhibits interleukin-5 which helps regular eosinophils, a type of white blood cell that contributes to asthma. The over-production of eosinophils can cause inflammation in the lungs, increasing the frequency of asthma attacks. Patients must also take other medications, including high dose inhaled corticosteroids and at least one additional asthma drug.\par \par Problem 2: Allergies/ Sinus\par -s/p Blood work to include: asthma panel (+), food IgE panel (-), IgE level, eosinophil level (+), vitamin D level (WNL)\par -continue Olopatadine 0.6% at 1 sniff/nostril BID \par Environmental measures for allergies were encouraged including mattress and pillow covers, air purifier, and environmental controls. \par \par Problem 3: Left Pleural Effusion (resolved)\par -Cleared on Current CXR\par \par Problem 4:? Sarcoidosis\par - no evidence clinically or radiographically\par \par Problem 5 ?MIGUEL (fatigue, snore, neck size) \par -complete home sleep study\par Sleep apnea is associated with adverse clinical consequences which can affect most organ systems. Cardiovascular disease risk includes arrhythmias, atrial fibrillation, hypertension, coronary artery disease, and stroke. Metabolic disorders include diabetes type 2, non-alcoholic fatty liver disease. Mood disorder especially depression; and cognitive decline especially in the elderly. Associations with chronic reflux/Adkins’s esophagus some but not all inclusive. \par -Reasons include arousal consistent with hypopnea; respiratory events most prominent in REM sleep or supine position; therefore sleep staging and body position are important for accurate diagnosis and estimation of AHI. \par \par Treatment options discussed including CPAP/BiPAP machine, oral appliance, ProVent therapy, Oxy-Aid by [employer], new technologies, or positional sleep.Recommended use of the CPAP machine for moderate (AHI >15), moderate to severe (AHI 15-30) and severe patients (AHI > 30). Recommended weight loss which can reduce AHI especially in weight loss of greater than 5% of BMI. Positional sleep is recommended in those with low AHI, low-moderate BMI, and younger age. For severe sleep apnea, the hypoglossal nerve stimulator was recommended as well. \par \par problem 6: cardiac disease (A Fib)\par -recommended to continue to follow up with Cardiologist ([facility]) \par \par problem 7: poor breathing mechanics\par -Proper breathing techniques were reviewed with an emphasis of exhalation. Patient instructed to breath in for 1 second and out for four seconds. Patient was encouraged to not talk while walking. \par \par problem 8: out of shape / overweight\par -recommended "MUNIQ" OTC supplement \par -Weight loss, exercise, and diet control were discussed and are highly encouraged. Treatment options were given such as, aqua therapy, and contacting a nutritionist. Recommended to use the elliptical, stationary bike, less use of treadmill. Mindful eating was explained to the patient Obesity is associated with worsening asthma, shortness of breath, and potential for cardiac disease, diabetes, and other underlying medical conditions\par \par problem 9: health maintenance \par - S/p Covid 19 vaccine (Pfizer) x2 3/2021\par -s/p yearly flu shot after October 2020\par -recommended strep pneumonia vaccines: Prevnar-13 vaccine, followed by Pneumo vaccine 23 one year following after 65\par -recommended early intervention for Upper Respiratory Infections (URIs)\par -recommended regular osteoporosis evaluations\par -recommended early dermatological evaluations\par -recommended after the age of 50 to the age of 70, colonoscopy every 5 years\par \par F/U in 6-8 weeks.\par He is encouraged to call with any changes, concerns, or questions\par

## 2021-10-25 NOTE — HISTORY OF PRESENT ILLNESS
[TextBox_4] : Mr. BENAVIDES is a 61 year old male with a history of Eosinophilic asthma, allergies/ sinus, ?remote sarcoidosis, left pleural effusion, ? MIGUEL presenting to the office today for pulmonary follow up. His chief complaint is\par \par -he notes sinuses are active\par -he notes use of nasal spray\par -he notes mourning the death of this mother\par -he notes restless sleep \par -he denies sleeping well\par -he notes intermittent reflux that has not worsened\par -he notes last COVID test was a few months ago\par -s/p Covid 19 Vaccine Pfizer x2 \par -he notes BP increased when taking Trelegy and started taking Bystolic to bring Bp down. Usually 150/80\par -he notes he stopped taking Trelegy when BP was high but condition worsened \par -he notes weight increased \par \par - He  denies any visual issues, headaches, nausea, vomiting, fever, chills, sweats, chest pains, chest pressure, diarrhea, constipation, dysphagia, myalgia, dizziness, leg swelling, leg pain, itchy eyes, itchy ears, or sour taste in the mouth.

## 2021-10-25 NOTE — PROCEDURE
[FreeTextEntry1] : 6 minute walk test reveals a low saturation of 97% with no evidence of dyspnea or fatigue; walked  505.3 meters

## 2021-10-25 NOTE — ADDENDUM
[FreeTextEntry1] : Documented by Connor Hale acting as a scribe for Dr. Jasbir Rockwell on 10/25/2021 \par \par All medical record entries made by the Scribe were at my, Dr. Jasbir Rockwell's, direction and personally dictated by me on 10/25/2021 . I have reviewed the chart and agree that the record accurately reflects my personal performance of the history, physical exam, assessment and plan. I have also personally directed, reviewed, and agree with the discharge instructions

## 2021-10-25 NOTE — PHYSICAL EXAM
[No Acute Distress] : no acute distress [Normal Oropharynx] : normal oropharynx [Normal Appearance] : normal appearance [No Neck Mass] : no neck mass [Normal Rate/Rhythm] : normal rate/rhythm [Normal S1, S2] : normal s1, s2 [No Murmurs] : no murmurs [No Resp Distress] : no resp distress [No Abnormalities] : no abnormalities [Benign] : benign [Normal Gait] : normal gait [No Clubbing] : no clubbing [No Cyanosis] : no cyanosis [No Edema] : no edema [FROM] : FROM [Normal Color/ Pigmentation] : normal color/ pigmentation [No Focal Deficits] : no focal deficits [Oriented x3] : oriented x3 [Normal Affect] : normal affect [III] : Mallampati Class: III [TextBox_68] : I:E ratio 1:3;very mild forced expiratory wheeze

## 2021-10-27 ENCOUNTER — NON-APPOINTMENT (OUTPATIENT)
Age: 61
End: 2021-10-27

## 2021-10-29 ENCOUNTER — NON-APPOINTMENT (OUTPATIENT)
Age: 61
End: 2021-10-29

## 2021-11-11 ENCOUNTER — NON-APPOINTMENT (OUTPATIENT)
Age: 61
End: 2021-11-11

## 2022-02-25 LAB — SARS-COV-2 N GENE NPH QL NAA+PROBE: NOT DETECTED

## 2022-02-28 ENCOUNTER — APPOINTMENT (OUTPATIENT)
Dept: PULMONOLOGY | Facility: CLINIC | Age: 62
End: 2022-02-28
Payer: COMMERCIAL

## 2022-02-28 VITALS
WEIGHT: 230 LBS | HEIGHT: 72 IN | SYSTOLIC BLOOD PRESSURE: 140 MMHG | DIASTOLIC BLOOD PRESSURE: 82 MMHG | BODY MASS INDEX: 31.15 KG/M2 | TEMPERATURE: 97.1 F | OXYGEN SATURATION: 98 % | HEART RATE: 87 BPM | RESPIRATION RATE: 16 BRPM

## 2022-02-28 PROCEDURE — 99214 OFFICE O/P EST MOD 30 MIN: CPT | Mod: 25

## 2022-02-28 PROCEDURE — 95012 NITRIC OXIDE EXP GAS DETER: CPT

## 2022-02-28 PROCEDURE — 94010 BREATHING CAPACITY TEST: CPT

## 2022-02-28 PROCEDURE — 94729 DIFFUSING CAPACITY: CPT

## 2022-02-28 PROCEDURE — ZZZZZ: CPT

## 2022-02-28 PROCEDURE — 94727 GAS DIL/WSHOT DETER LNG VOL: CPT

## 2022-02-28 RX ORDER — BECLOMETHASONE DIPROPIONATE HFA 80 UG/1
80 AEROSOL, METERED RESPIRATORY (INHALATION)
Qty: 1 | Refills: 2 | Status: DISCONTINUED | COMMUNITY
Start: 2020-07-29 | End: 2022-02-28

## 2022-02-28 RX ORDER — INDOMETHACIN 25 MG/1
25 CAPSULE ORAL
Qty: 30 | Refills: 0 | Status: ACTIVE | COMMUNITY
Start: 2021-09-09

## 2022-02-28 RX ORDER — NEBIVOLOL 5 MG/1
5 TABLET ORAL
Qty: 90 | Refills: 0 | Status: ACTIVE | COMMUNITY
Start: 2021-10-06

## 2022-02-28 RX ORDER — BETAMETHASONE DIPROPIONATE 0.5 MG/G
0.05 OINTMENT, AUGMENTED TOPICAL
Qty: 45 | Refills: 0 | Status: ACTIVE | COMMUNITY
Start: 2021-09-09

## 2022-02-28 RX ORDER — COLCHICINE 0.6 MG/1
0.6 TABLET ORAL
Qty: 60 | Refills: 0 | Status: ACTIVE | COMMUNITY
Start: 2021-12-20

## 2022-02-28 RX ORDER — MOMETASONE 50 UG/1
50 SPRAY, METERED NASAL
Qty: 3 | Refills: 1 | Status: ACTIVE | COMMUNITY
Start: 2022-02-28 | End: 1900-01-01

## 2022-02-28 NOTE — ADDENDUM
[FreeTextEntry1] : Documented by Connor Hale acting as a scribe for Dr. Jasbir Rockwell on 02/28/2022 \par \par All medical record entries made by the Scribe were at my, Dr. Jasbir Rockwell's, direction and personally dictated by me on 02/28/2022 . I have reviewed the chart and agree that the record accurately reflects my personal performance of the history, physical exam, assessment and plan. I have also personally directed, reviewed, and agree with the discharge instructions

## 2022-02-28 NOTE — HISTORY OF PRESENT ILLNESS
[TextBox_4] : Mr. BENAVIDES is a 62 year old male with a history of Eosinophilic asthma, allergies/ sinus, ?remote sarcoidosis, left pleural effusion, ? MIGUEL presenting to the office today for pulmonary follow up. His chief complaint is\par \par -he notes generally feeling good\par -he notes mild sinus congestion due to active allergies\par -he notes dealing with the loss of mother\par -he notes energy level is good\par -he notes going to the gym everyday for exercise\par -he notes intermittent active Afib\par -he notes recent gain in weight due to increased stress eating and poorer diet \par -he notes sleep quality decreased\par -he notes active gout \par -he notes high blood pressure after use of inhaler that eventually comes down to 140/80\par -he denies SOB \par -he denies covid 19 booster shot \par \par -denies any visual issues, headaches, nausea, vomiting, fever, chills, sweats, chest pain, chest pressure, diarrhea, constipation, dysphagia, dizziness, leg swelling, leg pain, itchy eyes, itchy ears, heartburn, reflux, or sour taste in the mouth.

## 2022-02-28 NOTE — PROCEDURE
[FreeTextEntry1] : Full PFT revealed mild restrictive and moderate obstructive dysfunction, with a FEV1 of 2.00L,which is 52% of predicted,  hyperinflated lung volumes, and normal diffusion of 26.3 , which is 97% of predicted with a normal flow volume loop \par \par FENO was 13 ; a normal value being less than 25\par Fractional exhaled nitric oxide (FENO) is regarded as a simple, noninvasive method for assessing eosinophilic airway inflammation. Produced by a variety of cells within the lung, nitric oxide (NO) concentrations are generally low in healthy individuals. However, high concentrations of NO appear to be involved in nonspecific host defense mechanisms and chronic inflammatory diseases such as asthma. The American Thor[employer] Society (ATS) therefore has recommended using FENO to aid in the diagnosis and monitoring of eosinophilic airway inflammation and asthma, and for identifying steroid responsive individuals whose chronic respiratory symptoms may be caused by airway inflammation.

## 2022-02-28 NOTE — ASSESSMENT
[FreeTextEntry1] : Mr. BENAVIDES is a 62 year old male with a history of ?remote sarcoidosis, asthma, (eosinophilic asthma), allergies, OW,  A Fib (PAF)  who now comes to the office for an pulmonary follow up. s/p 2020 Hospitalization for allergic reaction/ asthma  c/w eosinophilic asthma s/p Covid 19- #1 issue gout \par \par His shortness of breath is multifactorial due to:\par -poor mechanics of breathing \par -out of shape / overweight\par -pulmonary disease\par - Eosinophilic asthma, allergies/ sinus, ?remote sarcoidosis, left pleural effusion, r/o MIGUEL\par -?cardiac disease \par \par Problem 1: Asthma (still) - severe persistent\par -continue Trelegy 100 1 puff QD \par -continue Xopenex 0.63 via nebulizer q6H prn\par -continue Singulair 10 mg before bed \par Asthma is believed to be caused by inherited (genetic) and environmental factor, but its exact cause is unknown. Asthma may be triggered by allergens, lung infections, or irritants in the air. Asthma triggers are different for each person \par  -Inhaler technique reviewed as well as oral hygiene techniques reviewed with patient. Avoidance of cold air, extremes of temperature, rescue inhaler should be used before exercise. Order of medication reviewed with patient. Recommended use of a cool mist humidifier in the bedroom. \par \par Problem 1A: Eosinophilic Asthma (confirmed) \par -recheck eosinophil level\par -Candidate for Biologics (Fasenra, Nucala, Dupixent) \par -The safety and efficacy of Nucala was established in three double-blind, randomized, placebo-controlled trials in patients with severe asthma. Compared to a placebo, patients with severe asthma receiving Nucala had fewer exacerbation requiring hospitalization and/or emergency department visits, and a longer time to first exacerbation. In addition, patients with severe asthma receiving Nucala or Fasenra experienced greater reductions in their daily maintenance oral corticosteroid dose, while maintaining asthma control compared with patients receiving placebo. Treatment with Nucala did not result in a significant improvement in lung function, as measured by the volume of air exhaled by patients in one second. The most common side effects include: headache, injection site reactions, back pain, weakness, and fatigue; hypersensitivity reactions can occur within hours or days including swelling of the face, mouth, and tongue, fainting, dizziness, hives, breathing problems, and rash; herpes zoster infections have occurred. The drug is a monoclonal antibody that inhibits interleukin-5 which helps regular eosinophils, a type of white blood cell that contributes to asthma. The over-production of eosinophils can cause inflammation in the lungs, increasing the frequency of asthma attacks. Patients must also take other medications, including high dose inhaled corticosteroids and at least one additional asthma drug.\par \par Problem 2: Allergies/ Sinus\par -s/p Blood work to include: asthma panel (+), food IgE panel (-), IgE level, eosinophil level (+), vitamin D level (WNL)\par -continue Olopatadine 0.6% at 1 sniff/nostril BID \par Environmental measures for allergies were encouraged including mattress and pillow covers, air purifier, and environmental controls. \par \par Problem 3: Left Pleural Effusion (resolved)\par -Cleared on Current CXR\par \par Problem 4:? Sarcoidosis\par -recommended ophthalmologist evaluation  \par - no evidence clinically or radiographically\par -Sarcoidosis is a medical mystery and can present with very serious illness or little consequence. The disease can affect any organ including skin, liver, lymph glands, spleen, eyes, nervous system, musculoskeletal system, heart, brain, kidneys, and including the lungs. Pulmonary sarcoidosis can cause loss of lung volume and abnormal lung stiffness. This disease is characterized by presence of granulomas, small areas of inflamed cells. Sarcoidosis patients should have regular cardiac and eye examinations as well as regular PFTs. \par \par Problem 5 ?MIGUEL (fatigue, snore, neck size) \par -complete home sleep study\par Sleep apnea is associated with adverse clinical consequences which can affect most organ systems. Cardiovascular disease risk includes arrhythmias, atrial fibrillation, hypertension, coronary artery disease, and stroke. Metabolic disorders include diabetes type 2, non-alcoholic fatty liver disease. Mood disorder especially depression; and cognitive decline especially in the elderly. Associations with chronic reflux/Adkins’s esophagus some but not all inclusive. \par -Reasons include arousal consistent with hypopnea; respiratory events most prominent in REM sleep or supine position; therefore sleep staging and body position are important for accurate diagnosis and estimation of AHI. \par \par Treatment options discussed including CPAP/BiPAP machine, oral appliance, ProVent therapy, Oxy-Aid by [employer]pitec, new technologies, or positional sleep.Recommended use of the CPAP machine for moderate (AHI >15), moderate to severe (AHI 15-30) and severe patients (AHI > 30). Recommended weight loss which can reduce AHI especially in weight loss of greater than 5% of BMI. Positional sleep is recommended in those with low AHI, low-moderate BMI, and younger age. For severe sleep apnea, the hypoglossal nerve stimulator was recommended as well. \par \par problem 6: cardiac disease (A Fib)\par -recommended to continue to follow up with Cardiologist ([facility]) \par \par problem 7: poor breathing mechanics\par -recommended Wim Hof and Buteyko breathing techniques \par -Proper breathing techniques were reviewed with an emphasis of exhalation. Patient instructed to breath in for 1 second and out for four seconds. Patient was encouraged to not talk while walking. \par \par problem 8: out of shape / overweight\par -recommended "MUNIQ" OTC supplement \par -Weight loss, exercise, and diet control were discussed and are highly encouraged. Treatment options were given such as, aqua therapy, and contacting a nutritionist. Recommended to use the elliptical, stationary bike, less use of treadmill. Mindful eating was explained to the patient Obesity is associated with worsening asthma, shortness of breath, and potential for cardiac disease, diabetes, and other underlying medical conditions\par \par problem 9: health maintenance \par - S/p Covid 19 vaccine (Pfizer) x2 \par -s/p yearly flu shot after October 2021\par -recommended strep pneumonia vaccines: Prevnar-13 vaccine, followed by Pneumo vaccine 23 one year following after 65\par -recommended early intervention for Upper Respiratory Infections (URIs)\par -recommended regular osteoporosis evaluations\par -recommended early dermatological evaluations\par -recommended after the age of 50 to the age of 70, colonoscopy every 5 years\par \par F/U in 6-8 weeks.\par He is encouraged to call with any changes, concerns, or questions\par

## 2022-02-28 NOTE — PHYSICAL EXAM
[No Acute Distress] : no acute distress [Normal Oropharynx] : normal oropharynx [Normal Appearance] : normal appearance [No Neck Mass] : no neck mass [Normal Rate/Rhythm] : normal rate/rhythm [Normal S1, S2] : normal s1, s2 [No Murmurs] : no murmurs [No Resp Distress] : no resp distress [No Abnormalities] : no abnormalities [Benign] : benign [Normal Gait] : normal gait [No Clubbing] : no clubbing [No Cyanosis] : no cyanosis [No Edema] : no edema [FROM] : FROM [Normal Color/ Pigmentation] : normal color/ pigmentation [No Focal Deficits] : no focal deficits [Oriented x3] : oriented x3 [Normal Affect] : normal affect [II] : Mallampati Class: II [TextBox_68] : I:E ratio 1:3; clear

## 2022-06-03 ENCOUNTER — APPOINTMENT (OUTPATIENT)
Dept: ORTHOPEDIC SURGERY | Facility: CLINIC | Age: 62
End: 2022-06-03
Payer: COMMERCIAL

## 2022-06-03 VITALS — HEIGHT: 72 IN | BODY MASS INDEX: 32.23 KG/M2 | WEIGHT: 238 LBS

## 2022-06-03 DIAGNOSIS — M65.342 TRIGGER FINGER, LEFT RING FINGER: ICD-10-CM

## 2022-06-03 PROCEDURE — J3490M: CUSTOM

## 2022-06-03 PROCEDURE — 20550 NJX 1 TENDON SHEATH/LIGAMENT: CPT

## 2022-06-03 PROCEDURE — 99214 OFFICE O/P EST MOD 30 MIN: CPT | Mod: 25

## 2022-06-03 NOTE — PHYSICAL EXAM
[de-identified] : L hand: \par Mild swelling \par Tender 4th A1 pulley \par Decreased ring ROM \par +ring triggering\par

## 2022-06-03 NOTE — HISTORY OF PRESENT ILLNESS
[5] : 5 [4] : 4 [Dull/Aching] : dull/aching [Nothing helps with pain getting better] : Nothing helps with pain getting better [de-identified] : L RF trigger  [] : no [FreeTextEntry1] : L hand [FreeTextEntry3] : 6 months ago [FreeTextEntry5] : denies injury. has clicking and locking  [de-identified] : activity

## 2022-06-28 ENCOUNTER — APPOINTMENT (OUTPATIENT)
Dept: PULMONOLOGY | Facility: CLINIC | Age: 62
End: 2022-06-28

## 2022-07-21 ENCOUNTER — APPOINTMENT (OUTPATIENT)
Dept: PULMONOLOGY | Facility: CLINIC | Age: 62
End: 2022-07-21

## 2022-07-21 ENCOUNTER — NON-APPOINTMENT (OUTPATIENT)
Age: 62
End: 2022-07-21

## 2022-07-21 VITALS
DIASTOLIC BLOOD PRESSURE: 80 MMHG | HEART RATE: 80 BPM | BODY MASS INDEX: 32.2 KG/M2 | TEMPERATURE: 98 F | WEIGHT: 230 LBS | SYSTOLIC BLOOD PRESSURE: 140 MMHG | HEIGHT: 71 IN | RESPIRATION RATE: 16 BRPM | OXYGEN SATURATION: 98 %

## 2022-07-21 DIAGNOSIS — Z72.820 SLEEP DEPRIVATION: ICD-10-CM

## 2022-07-21 PROCEDURE — 99214 OFFICE O/P EST MOD 30 MIN: CPT | Mod: 25

## 2022-07-21 PROCEDURE — 94010 BREATHING CAPACITY TEST: CPT

## 2022-07-21 PROCEDURE — 95012 NITRIC OXIDE EXP GAS DETER: CPT

## 2022-07-21 RX ORDER — AMLODIPINE BESYLATE 5 MG/1
5 TABLET ORAL
Qty: 90 | Refills: 0 | Status: ACTIVE | COMMUNITY
Start: 2022-07-14

## 2022-07-21 NOTE — REASON FOR VISIT
[Follow-Up] : a follow-up visit [TextBox_44] : Eosinophilic asthma, allergies/ sinus, (+) remote sarcoidosis, left pleural effusion, r/o MIGUEL

## 2022-07-21 NOTE — PHYSICAL EXAM
[No Acute Distress] : no acute distress [Normal Oropharynx] : normal oropharynx [III] : Mallampati Class: III [Normal Appearance] : normal appearance [No Neck Mass] : no neck mass [Normal Rate/Rhythm] : normal rate/rhythm [Normal S1, S2] : normal s1, s2 [No Murmurs] : no murmurs [No Resp Distress] : no resp distress [No Abnormalities] : no abnormalities [Benign] : benign [Normal Gait] : normal gait [No Clubbing] : no clubbing [No Cyanosis] : no cyanosis [No Edema] : no edema [FROM] : FROM [Normal Color/ Pigmentation] : normal color/ pigmentation [No Focal Deficits] : no focal deficits [Oriented x3] : oriented x3 [Normal Affect] : normal affect [TextBox_68] : I:E ratio 1:3; clear  [TextBox_105] : 1+ trace pedal edema

## 2022-07-21 NOTE — ADDENDUM
[FreeTextEntry1] : Documented by Connor Hale acting as a scribe for Dr. Jasbir Rockwell on 07/21/2022 \par \par All medical record entries made by the Scribe were at my, Dr. Jasbir Rockwell's, direction and personally dictated by me on 07/21/2022 . I have reviewed the chart and agree that the record accurately reflects my personal performance of the history, physical exam, assessment and plan. I have also personally directed, reviewed, and agree with the discharge instructions

## 2022-07-21 NOTE — ASSESSMENT
[FreeTextEntry1] : Mr. BENAVIDES is a 62 year old male with a history of (+) remote sarcoidosis, asthma, (eosinophilic asthma), allergies, OW,  A Fib (PAF)  who now comes to the office for an pulmonary follow up. s/p 2020 Hospitalization for allergic reaction/ asthma  c/w eosinophilic asthma s/p Covid 19 winter 2021- #1 issue is elevated BP\par \par His shortness of breath is multifactorial due to:\par -poor mechanics of breathing \par -out of shape / overweight\par -pulmonary disease\par - Eosinophilic asthma, allergies/ sinus, ?remote sarcoidosis, left pleural effusion, r/o MIGUEL\par -?cardiac disease \par \par Problem 1: Asthma (still) - severe persistent\par -continue Trelegy 100 1 puff QD \par -continue Xopenex 0.63 via nebulizer q6H prn\par -continue Singulair 10 mg before bed \par Asthma is believed to be caused by inherited (genetic) and environmental factor, but its exact cause is unknown. Asthma may be triggered by allergens, lung infections, or irritants in the air. Asthma triggers are different for each person \par  -Inhaler technique reviewed as well as oral hygiene techniques reviewed with patient. Avoidance of cold air, extremes of temperature, rescue inhaler should be used before exercise. Order of medication reviewed with patient. Recommended use of a cool mist humidifier in the bedroom. \par \par Problem 1A: Eosinophilic Asthma (confirmed) \par -recheck eosinophil level\par -Candidate for Biologics (Fasenra, Nucala, Dupixent) \par -The safety and efficacy of Nucala was established in three double-blind, randomized, placebo-controlled trials in patients with severe asthma. Compared to a placebo, patients with severe asthma receiving Nucala had fewer exacerbation requiring hospitalization and/or emergency department visits, and a longer time to first exacerbation. In addition, patients with severe asthma receiving Nucala or Fasenra experienced greater reductions in their daily maintenance oral corticosteroid dose, while maintaining asthma control compared with patients receiving placebo. Treatment with Nucala did not result in a significant improvement in lung function, as measured by the volume of air exhaled by patients in one second. The most common side effects include: headache, injection site reactions, back pain, weakness, and fatigue; hypersensitivity reactions can occur within hours or days including swelling of the face, mouth, and tongue, fainting, dizziness, hives, breathing problems, and rash; herpes zoster infections have occurred. The drug is a monoclonal antibody that inhibits interleukin-5 which helps regular eosinophils, a type of white blood cell that contributes to asthma. The over-production of eosinophils can cause inflammation in the lungs, increasing the frequency of asthma attacks. Patients must also take other medications, including high dose inhaled corticosteroids and at least one additional asthma drug.\par \par Problem 2: Allergies/ Sinus\par -s/p Blood work to include: asthma panel (+), food IgE panel (-), IgE level, eosinophil level (+), vitamin D level (WNL)\par -continue Olopatadine 0.6% at 1 sniff/nostril BID \par Environmental measures for allergies were encouraged including mattress and pillow covers, air purifier, and environmental controls. \par \par Problem 3: Left Pleural Effusion (resolved)\par -Cleared on CXR\par \par Problem 4:? Sarcoidosis\par -recommended ophthalmologist evaluation  \par - no evidence clinically or radiographically\par -Sarcoidosis is a medical mystery and can present with very serious illness or little consequence. The disease can affect any organ including skin, liver, lymph glands, spleen, eyes, nervous system, musculoskeletal system, heart, brain, kidneys, and including the lungs. Pulmonary sarcoidosis can cause loss of lung volume and abnormal lung stiffness. This disease is characterized by presence of granulomas, small areas of inflamed cells. Sarcoidosis patients should have regular cardiac and eye examinations as well as regular PFTs. \par \par Problem 5 ?MIGUEL (fatigue, snore, neck size) \par -complete home sleep study\par Sleep apnea is associated with adverse clinical consequences which can affect most organ systems. Cardiovascular disease risk includes arrhythmias, atrial fibrillation, hypertension, coronary artery disease, and stroke. Metabolic disorders include diabetes type 2, non-alcoholic fatty liver disease. Mood disorder especially depression; and cognitive decline especially in the elderly. Associations with chronic reflux/Adkins’s esophagus some but not all inclusive. \par -Reasons include arousal consistent with hypopnea; respiratory events most prominent in REM sleep or supine position; therefore sleep staging and body position are important for accurate diagnosis and estimation of AHI. \par \par Treatment options discussed including CPAP/BiPAP machine, oral appliance, ProVent therapy, Oxy-Aid by Respitec, new technologies, or positional sleep.Recommended use of the CPAP machine for moderate (AHI >15), moderate to severe (AHI 15-30) and severe patients (AHI > 30). Recommended weight loss which can reduce AHI especially in weight loss of greater than 5% of BMI. Positional sleep is recommended in those with low AHI, low-moderate BMI, and younger age. For severe sleep apnea, the hypoglossal nerve stimulator was recommended as well. \par \par problem 6: cardiac disease (A Fib)\par -recommended to continue to follow [facility] \par \par problem 7: poor breathing mechanics\par -recommended Wim Hof and Buteyko breathing techniques \par -Proper breathing techniques were reviewed with an emphasis of exhalation. Patient instructed to breath in for 1 second and out for four seconds. Patient was encouraged to not talk while walking. \par \par problem 8: out of shape / overweight\par Recommended "10-Day Detox" by Jersey Weiss for 2-3 weeks followed by probiotics \par -recommended "MUNIQ" OTC supplement \par -Weight loss, exercise, and diet control were discussed and are highly encouraged. Treatment options were given such as, aqua therapy, and contacting a nutritionist. Recommended to use the elliptical, stationary bike, less use of treadmill. Mindful eating was explained to the patient Obesity is associated with worsening asthma, shortness of breath, and potential for cardiac disease, diabetes, and other underlying medical conditions\par \par problem 9: health maintenance \par -s/p COVID 19 winter 2021\par - S/p Covid 19 vaccine (Pfizer) x2 \par -s/p yearly flu shot after October 2021\par -recommended strep pneumonia vaccines: Prevnar-13 vaccine, followed by Pneumo vaccine 23 one year following after 65\par -recommended early intervention for Upper Respiratory Infections (URIs)\par -recommended regular osteoporosis evaluations\par -recommended early dermatological evaluations\par -recommended after the age of 50 to the age of 70, colonoscopy every 5 years\par \par F/U in 6-8 weeks.\par He is encouraged to call with any changes, concerns, or questions\par

## 2022-07-21 NOTE — HISTORY OF PRESENT ILLNESS
[TextBox_4] : Mr. BENAVIDES is a 62 year old male with a history of Eosinophilic asthma, allergies/ sinus, ?remote sarcoidosis, left pleural effusion, ? MIGUEL presenting to the office today for pulmonary follow up. His chief complaint is\par \par -he note he's on Norvasc\par -he notes his BP has been high following use of his inhaler (boosted 15 in systolic pressure)\par -he notes pedal edema\par -he notes active gout\par -he notes intermittent reflux\par -he notes some blurriness \par -He notes bowels are regular \par -he notes he sees Dr. Ramirez added Norvasc to the Bystolic medication\par -he notes his sleep can be improved\par -he notes snoring\par -he notes waking up relaxed, but with stiffness that lasts throughout the day\par -he notes anxiety\par -he notes a "sour stomach" that is improved after eating\par -he notes intermittent muscle spams in abdomen and right chest that is improved with hydration\par -he notes s/p COVID 12/2021\par -he notes s/p covid 19 vaccine x2\par \par \par -He denies any visual issues, headaches, nausea, vomiting, fever, chills, sweats, chest pressure, diarrhea, constipation, dysphagia, dizziness, itchy eyes, itchy ears, heartburn, or sour taste in the mouth.

## 2022-08-08 ENCOUNTER — NON-APPOINTMENT (OUTPATIENT)
Age: 62
End: 2022-08-08

## 2022-10-14 RX ORDER — TIOTROPIUM BROMIDE AND OLODATEROL 3.124; 2.736 UG/1; UG/1
2.5-2.5 SPRAY, METERED RESPIRATORY (INHALATION)
Qty: 3 | Refills: 1 | Status: ACTIVE | COMMUNITY
Start: 2022-10-14 | End: 1900-01-01

## 2022-10-14 RX ORDER — FLUTICASONE FUROATE, UMECLIDINIUM BROMIDE AND VILANTEROL TRIFENATATE 100; 62.5; 25 UG/1; UG/1; UG/1
100-62.5-25 POWDER RESPIRATORY (INHALATION)
Qty: 180 | Refills: 1 | Status: DISCONTINUED | COMMUNITY
Start: 2020-10-26 | End: 2022-10-14

## 2022-10-18 ENCOUNTER — APPOINTMENT (OUTPATIENT)
Dept: PULMONOLOGY | Facility: CLINIC | Age: 62
End: 2022-10-18

## 2022-12-02 ENCOUNTER — APPOINTMENT (OUTPATIENT)
Dept: PULMONOLOGY | Facility: CLINIC | Age: 62
End: 2022-12-02

## 2022-12-02 VITALS
HEIGHT: 72 IN | RESPIRATION RATE: 16 BRPM | SYSTOLIC BLOOD PRESSURE: 130 MMHG | TEMPERATURE: 98 F | BODY MASS INDEX: 30.48 KG/M2 | WEIGHT: 225 LBS | OXYGEN SATURATION: 97 % | HEART RATE: 110 BPM | DIASTOLIC BLOOD PRESSURE: 75 MMHG

## 2022-12-02 PROCEDURE — 95012 NITRIC OXIDE EXP GAS DETER: CPT

## 2022-12-02 PROCEDURE — 94729 DIFFUSING CAPACITY: CPT

## 2022-12-02 PROCEDURE — 94727 GAS DIL/WSHOT DETER LNG VOL: CPT

## 2022-12-02 PROCEDURE — 94010 BREATHING CAPACITY TEST: CPT

## 2022-12-02 PROCEDURE — 99214 OFFICE O/P EST MOD 30 MIN: CPT | Mod: 25

## 2022-12-02 RX ORDER — METRONIDAZOLE 500 MG/1
500 TABLET ORAL
Qty: 30 | Refills: 0 | Status: DISCONTINUED | COMMUNITY
Start: 2022-07-25

## 2022-12-02 RX ORDER — BUDESONIDE, GLYCOPYRROLATE, AND FORMOTEROL FUMARATE 160; 9; 4.8 UG/1; UG/1; UG/1
160-9-4.8 AEROSOL, METERED RESPIRATORY (INHALATION)
Qty: 1 | Refills: 1 | Status: DISCONTINUED | COMMUNITY
Start: 2021-09-10 | End: 2022-12-02

## 2022-12-02 RX ORDER — CIPROFLOXACIN HYDROCHLORIDE 500 MG/1
500 TABLET, FILM COATED ORAL
Qty: 20 | Refills: 0 | Status: DISCONTINUED | COMMUNITY
Start: 2022-07-25

## 2022-12-14 NOTE — PHYSICAL EXAM
[No Acute Distress] : no acute distress [Normal Oropharynx] : normal oropharynx [III] : Mallampati Class: III [Normal Appearance] : normal appearance [No Neck Mass] : no neck mass [Normal Rate/Rhythm] : normal rate/rhythm [Normal S1, S2] : normal s1, s2 [No Murmurs] : no murmurs [No Resp Distress] : no resp distress [No Abnormalities] : no abnormalities [Benign] : benign [Normal Gait] : normal gait [No Clubbing] : no clubbing [No Cyanosis] : no cyanosis [No Edema] : no edema [FROM] : FROM [Normal Color/ Pigmentation] : normal color/ pigmentation [No Focal Deficits] : no focal deficits [Oriented x3] : oriented x3 [Normal Affect] : normal affect [TextBox_68] : I:E ratio 1:3; mild expiratory wheeze [TextBox_105] : 1+ trace pedal edema

## 2022-12-14 NOTE — ASSESSMENT
[FreeTextEntry1] : Mr. BENAVIDES is a 62 year old male with a history of (+) remote sarcoidosis, severe persistent asthma, (eosinophilic asthma), allergies, OW,  A Fib (PAF)  who now comes to the office for an pulmonary follow up. s/p 2020 Hospitalization for allergic reaction/ asthma  c/w eosinophilic asthma s/p Covid 19 winter 2021- #1 issue is elevated BP #2 issue is ocular issues (NC w/ trelegy)\par \par His shortness of breath is multifactorial due to:\par -poor mechanics of breathing \par -out of shape / overweight\par -pulmonary disease\par - Eosinophilic asthma, allergies/ sinus, ?remote sarcoidosis, left pleural effusion, r/o MIGUEL\par -?cardiac disease \par \par Problem 1: Asthma (still) - severe persistent\par -off Trelegy 100 1 puff QD move to Stiolto 2 puffs QD/ Alvesco 80 1 puff BID \par -continue Xopenex 0.63 via nebulizer q6H prn\par -continue Singulair 10 mg before bed \par Asthma is believed to be caused by inherited (genetic) and environmental factor, but its exact cause is unknown. Asthma may be triggered by allergens, lung infections, or irritants in the air. Asthma triggers are different for each person \par  -Inhaler technique reviewed as well as oral hygiene techniques reviewed with patient. Avoidance of cold air, extremes of temperature, rescue inhaler should be used before exercise. Order of medication reviewed with patient. Recommended use of a cool mist humidifier in the bedroom. \par \par Problem 1A: Eosinophilic Asthma (confirmed) \par -recheck eosinophil level\par -Candidate for Biologics (Fasenra, Nucala, Dupixent) (d/w Pt 12/2/2022)\par -The safety and efficacy of Nucala was established in three double-blind, randomized, placebo-controlled trials in patients with severe asthma. Compared to a placebo, patients with severe asthma receiving Nucala had fewer exacerbation requiring hospitalization and/or emergency department visits, and a longer time to first exacerbation. In addition, patients with severe asthma receiving Nucala or Fasenra experienced greater reductions in their daily maintenance oral corticosteroid dose, while maintaining asthma control compared with patients receiving placebo. Treatment with Nucala did not result in a significant improvement in lung function, as measured by the volume of air exhaled by patients in one second. The most common side effects include: headache, injection site reactions, back pain, weakness, and fatigue; hypersensitivity reactions can occur within hours or days including swelling of the face, mouth, and tongue, fainting, dizziness, hives, breathing problems, and rash; herpes zoster infections have occurred. The drug is a monoclonal antibody that inhibits interleukin-5 which helps regular eosinophils, a type of white blood cell that contributes to asthma. The over-production of eosinophils can cause inflammation in the lungs, increasing the frequency of asthma attacks. Patients must also take other medications, including high dose inhaled corticosteroids and at least one additional asthma drug.\par \par Problem 2: Allergies/ Sinus\par -s/p Blood work to include: asthma panel (+), food IgE panel (-), IgE level, eosinophil level (+), vitamin D level (WNL)\par -continue Olopatadine 0.6% at 1 sniff/nostril BID \par Environmental measures for allergies were encouraged including mattress and pillow covers, air purifier, and environmental controls. \par \par Problem 3: Left Pleural Effusion (resolved)\par -Cleared on CXR\par \par Problem 4:? Sarcoidosis\par -recommended ophthalmologist evaluation  \par - no evidence clinically or radiographically\par -Sarcoidosis is a medical mystery and can present with very serious illness or little consequence. The disease can affect any organ including skin, liver, lymph glands, spleen, eyes, nervous system, musculoskeletal system, heart, brain, kidneys, and including the lungs. Pulmonary sarcoidosis can cause loss of lung volume and abnormal lung stiffness. This disease is characterized by presence of granulomas, small areas of inflamed cells. Sarcoidosis patients should have regular cardiac and eye examinations as well as regular PFTs. \par \par Problem 5 ?MIGUEL (fatigue, snore, neck size) \par -complete home sleep study\par Sleep apnea is associated with adverse clinical consequences which can affect most organ systems. Cardiovascular disease risk includes arrhythmias, atrial fibrillation, hypertension, coronary artery disease, and stroke. Metabolic disorders include diabetes type 2, non-alcoholic fatty liver disease. Mood disorder especially depression; and cognitive decline especially in the elderly. Associations with chronic reflux/Adknis’s esophagus some but not all inclusive. \par -Reasons include arousal consistent with hypopnea; respiratory events most prominent in REM sleep or supine position; therefore sleep staging and body position are important for accurate diagnosis and estimation of AHI. \par \par Treatment options discussed including CPAP/BiPAP machine, oral appliance, ProVent therapy, Oxy-Aid by Respitec, new technologies, or positional sleep.Recommended use of the CPAP machine for moderate (AHI >15), moderate to severe (AHI 15-30) and severe patients (AHI > 30). Recommended weight loss which can reduce AHI especially in weight loss of greater than 5% of BMI. Positional sleep is recommended in those with low AHI, low-moderate BMI, and younger age. For severe sleep apnea, the hypoglossal nerve stimulator was recommended as well. \par \par problem 6: cardiac disease (A Fib)\par -recommended to continue to follow up with Cardiologist ([facility]) \par \par problem 7: poor breathing mechanics\par -recommended Wim Hof and Buteyko breathing techniques \par -Proper breathing techniques were reviewed with an emphasis of exhalation. Patient instructed to breath in for 1 second and out for four seconds. Patient was encouraged to not talk while walking. \par \par problem 8: out of shape / overweight\par Recommended "10-Day Detox" by Jersey Weiss for 2-3 weeks followed by probiotics \par -recommended "MUNIQ" OTC supplement \par -Weight loss, exercise, and diet control were discussed and are highly encouraged. Treatment options were given such as, aqua therapy, and contacting a nutritionist. Recommended to use the elliptical, stationary bike, less use of treadmill. Mindful eating was explained to the patient Obesity is associated with worsening asthma, shortness of breath, and potential for cardiac disease, diabetes, and other underlying medical conditions\par \par problem 9: health maintenance \par -s/p COVID 19 winter 2021\par - S/p Covid 19 vaccine (Pfizer) x2 \par - yearly flu shot after October 2022 (refused)\par -recommended strep pneumonia vaccines: Prevnar-13 vaccine, followed by Pneumo vaccine 23 one year following after 65\par -recommended early intervention for Upper Respiratory Infections (URIs)\par -recommended regular osteoporosis evaluations\par -recommended early dermatological evaluations\par -recommended after the age of 50 to the age of 70, colonoscopy every 5 years\par \par F/U in 6-8 weeks.\par He is encouraged to call with any changes, concerns, or questions\par

## 2022-12-14 NOTE — HISTORY OF PRESENT ILLNESS
[TextBox_4] : Mr. BENAVIDES is a 62 year old male with a history of Eosinophilic asthma, allergies/ sinus, ?remote sarcoidosis, left pleural effusion, ? MIGUEL presenting to the office today for pulmonary follow up. His chief complaint is\par \par -he notes feeling generally well \par -he notes energy levels are good \par -he notes d/c Trelegy due to poorly tolerating it\par -he notes he has not been using any inhalers for the past month\par -he notes concern over taking inhaled steroids because of blood pressure\par -he notes his blood pressure increases after taking trelegy\par -he notes intermittent dyspnea\par -he notes exercising (walking, gym daily)\par -he notes weight is stable \par -he notes vision is poor\par -he notes BP issues \par -he notes intermittent wheeze\par -he notes PND\par \par -he denies any headaches, nausea, vomiting, fever, chills, sweats, chest pain, chest pressure, palpitations, constipation, diarrhea, dizziness, dysphagia, heartburn, reflux, itchy eyes, itchy ears, leg swelling, leg pain, arthralgias, myalgias, or sour taste in the mouth.

## 2022-12-14 NOTE — ADDENDUM
[FreeTextEntry1] : ***********************Amended by Connor Hale on 12/14/2022\par \par Documented by NICKI Arias acting as a scribe for Dr. Jasbir Rockwell on 12/02/2022 .\par \par All medical record entries made by the Scribe were at my, Dr. Jasbir Rockwell's, direction and personally dictated by me on 12/02/2022. I have reviewed the chart and agree that the record accurately reflects my personal performance of the history, physical exam, assessment and plan. I have also personally directed, reviewed, and agree with the discharge instructions.

## 2022-12-14 NOTE — PROCEDURE
[FreeTextEntry1] : Full PFT reveals moderate obstructive dysfunction; FEV1 was 2.41L which is 63% of predicted; normal lung volumes; normal diffusion at 33.0, which is 123% of predicted; normal flow volume loop.\par \par FENO was 19; a normal value being less than 25\par Fractional exhaled nitric oxide (FENO) is regarded as a simple, noninvasive method for assessing eosinophilic airway inflammation. Produced by a variety of cells within the lung, nitric oxide (NO) concentrations are generally low in healthy individuals. However, high concentrations of NO appear to be involved in nonspecific host defense mechanisms and chronic inflammatory diseases such as asthma. The American Thoracic Society (ATS) therefore has recommended using FENO to aid in the diagnosis and monitoring of eosinophilic airway inflammation and asthma, and for identifying steroid responsive individuals whose chronic respiratory symptoms may be caused by airway inflammation.

## 2022-12-29 ENCOUNTER — APPOINTMENT (OUTPATIENT)
Dept: PULMONOLOGY | Facility: CLINIC | Age: 62
End: 2022-12-29

## 2022-12-29 DIAGNOSIS — Z11.59 ENCOUNTER FOR SCREENING FOR OTHER VIRAL DISEASES: ICD-10-CM

## 2022-12-29 DIAGNOSIS — J30.9 ALLERGIC RHINITIS, UNSPECIFIED: ICD-10-CM

## 2022-12-29 DIAGNOSIS — D86.9 SARCOIDOSIS, UNSPECIFIED: ICD-10-CM

## 2022-12-29 DIAGNOSIS — U07.1 COVID-19: ICD-10-CM

## 2022-12-29 DIAGNOSIS — J90 PLEURAL EFFUSION, NOT ELSEWHERE CLASSIFIED: ICD-10-CM

## 2022-12-29 DIAGNOSIS — J82.83 EOSINOPHILIC ASTHMA: ICD-10-CM

## 2022-12-29 DIAGNOSIS — J45.50 SEVERE PERSISTENT ASTHMA, UNCOMPLICATED: ICD-10-CM

## 2022-12-29 DIAGNOSIS — J06.9 ACUTE UPPER RESPIRATORY INFECTION, UNSPECIFIED: ICD-10-CM

## 2022-12-29 DIAGNOSIS — G47.33 OBSTRUCTIVE SLEEP APNEA (ADULT) (PEDIATRIC): ICD-10-CM

## 2022-12-29 DIAGNOSIS — R06.02 SHORTNESS OF BREATH: ICD-10-CM

## 2022-12-29 PROCEDURE — 99214 OFFICE O/P EST MOD 30 MIN: CPT | Mod: 95

## 2022-12-29 RX ORDER — LEVALBUTEROL HYDROCHLORIDE 0.31 MG/3ML
0.31 SOLUTION RESPIRATORY (INHALATION) EVERY 6 HOURS
Qty: 120 | Refills: 2 | Status: ACTIVE | COMMUNITY
Start: 2022-12-29 | End: 1900-01-01

## 2022-12-29 NOTE — ADDENDUM
[FreeTextEntry1] : Documented by Francisco Eduardo acting as a scribe for Dr. Jasbir Rockwell on 12/29/2022.\par \par All medical record entries made by the Scribe were at my, Dr. Jasbir Rockwell's, direction and personally dictated by me on 12/29/2022. I have reviewed the chart and agree that the record accurately reflects my personal performance of the history, physical exam, assessment and plan. I have also personally directed, reviewed, and agree with the discharge instructions.

## 2022-12-29 NOTE — ASSESSMENT
[FreeTextEntry1] : Mr. BENAVIDES is a 62 year old male with a history of (+) remote sarcoidosis, severe persistent asthma, (eosinophilic asthma), allergies, OW,  A Fib (PAF)  who now comes to the office for an pulmonary follow up via video call. s/p 2020 Hospitalization for allergic reaction/ asthma  c/w eosinophilic asthma s/p Covid 19 winter 2021-  (BP/ocular issues due to ?ICS) - now URI/active asthma\par \par His shortness of breath is multifactorial due to:\par -poor mechanics of breathing \par -out of shape / overweight\par -pulmonary disease\par - Eosinophilic asthma, allergies/ sinus, ?remote sarcoidosis, left pleural effusion, r/o MIGUEL\par -?cardiac disease \par \par Problem 1: Asthma (still) - severe persistent (active)\par -off Trelegy 100 1 puff QD move to Stiolto 2 puffs QD/ Alvesco 80 1 puff BID (refused)\par -continue Xopenex 0.31 via nebulizer q6H prn\par -Add budesonide 0.5 BID \par -continue Singulair 10 mg before bed \par Asthma is believed to be caused by inherited (genetic) and environmental factor, but its exact cause is unknown. Asthma may be triggered by allergens, lung infections, or irritants in the air. Asthma triggers are different for each person \par  -Inhaler technique reviewed as well as oral hygiene techniques reviewed with patient. Avoidance of cold air, extremes of temperature, rescue inhaler should be used before exercise. Order of medication reviewed with patient. Recommended use of a cool mist humidifier in the bedroom. \par \par Problem 1A: Eosinophilic Asthma (confirmed) \par -recheck eosinophil level\par -Candidate for Biologics (Fasenra, Nucala, Dupixent) (d/w Pt 12/2/2022)\par -The safety and efficacy of Nucala was established in three double-blind, randomized, placebo-controlled trials in patients with severe asthma. Compared to a placebo, patients with severe asthma receiving Nucala had fewer exacerbation requiring hospitalization and/or emergency department visits, and a longer time to first exacerbation. In addition, patients with severe asthma receiving Nucala or Fasenra experienced greater reductions in their daily maintenance oral corticosteroid dose, while maintaining asthma control compared with patients receiving placebo. Treatment with Nucala did not result in a significant improvement in lung function, as measured by the volume of air exhaled by patients in one second. The most common side effects include: headache, injection site reactions, back pain, weakness, and fatigue; hypersensitivity reactions can occur within hours or days including swelling of the face, mouth, and tongue, fainting, dizziness, hives, breathing problems, and rash; herpes zoster infections have occurred. The drug is a monoclonal antibody that inhibits interleukin-5 which helps regular eosinophils, a type of white blood cell that contributes to asthma. The over-production of eosinophils can cause inflammation in the lungs, increasing the frequency of asthma attacks. Patients must also take other medications, including high dose inhaled corticosteroids and at least one additional asthma drug.\par \par Problem 1B: URI - ?influenza\par -add Mucinex sinus \par -continue prednisone taper\par \par Problem 2: Allergies/ Sinus\par -s/p Blood work to include: asthma panel (+), food IgE panel (-), IgE level, eosinophil level (+), vitamin D level (WNL)\par -continue Olopatadine 0.6% at 1 sniff/nostril BID \par Environmental measures for allergies were encouraged including mattress and pillow covers, air purifier, and environmental controls. \par \par Problem 3: Left Pleural Effusion (resolved)\par -Cleared on CXR\par \par Problem 4:? Sarcoidosis\par -recommended ophthalmologist evaluation  \par - no evidence clinically or radiographically\par -Sarcoidosis is a medical mystery and can present with very serious illness or little consequence. The disease can affect any organ including skin, liver, lymph glands, spleen, eyes, nervous system, musculoskeletal system, heart, brain, kidneys, and including the lungs. Pulmonary sarcoidosis can cause loss of lung volume and abnormal lung stiffness. This disease is characterized by presence of granulomas, small areas of inflamed cells. Sarcoidosis patients should have regular cardiac and eye examinations as well as regular PFTs. \par \par Problem 5 ?MIGUEL (fatigue, snore, neck size) \par -complete home sleep study\par Sleep apnea is associated with adverse clinical consequences which can affect most organ systems. Cardiovascular disease risk includes arrhythmias, atrial fibrillation, hypertension, coronary artery disease, and stroke. Metabolic disorders include diabetes type 2, non-alcoholic fatty liver disease. Mood disorder especially depression; and cognitive decline especially in the elderly. Associations with chronic reflux/Adkins’s esophagus some but not all inclusive. \par -Reasons include arousal consistent with hypopnea; respiratory events most prominent in REM sleep or supine position; therefore sleep staging and body position are important for accurate diagnosis and estimation of AHI. \par \par Treatment options discussed including CPAP/BiPAP machine, oral appliance, ProVent therapy, Oxy-Aid by Respitec, new technologies, or positional sleep.Recommended use of the CPAP machine for moderate (AHI >15), moderate to severe (AHI 15-30) and severe patients (AHI > 30). Recommended weight loss which can reduce AHI especially in weight loss of greater than 5% of BMI. Positional sleep is recommended in those with low AHI, low-moderate BMI, and younger age. For severe sleep apnea, the hypoglossal nerve stimulator was recommended as well. \par \par problem 6: cardiac disease (A Fib)\par -recommended to continue to follow up with Cardiologist ([facility]) \par \par problem 7: poor breathing mechanics\par -recommended Wim Hof and Buteyko breathing techniques \par -Proper breathing techniques were reviewed with an emphasis of exhalation. Patient instructed to breath in for 1 second and out for four seconds. Patient was encouraged to not talk while walking. \par \par problem 8: out of shape / overweight\par Recommended "10-Day Detox" by Jersey Weiss for 2-3 weeks followed by probiotics \par -recommended "MUNIQ" OTC supplement \par -Weight loss, exercise, and diet control were discussed and are highly encouraged. Treatment options were given such as, aqua therapy, and contacting a nutritionist. Recommended to use the elliptical, stationary bike, less use of treadmill. Mindful eating was explained to the patient Obesity is associated with worsening asthma, shortness of breath, and potential for cardiac disease, diabetes, and other underlying medical conditions\par \par problem 9: health maintenance \par -s/p COVID 19 winter 2021\par - S/p Covid 19 vaccine (Pfizer) x2 \par - yearly flu shot after October 2022 (refused)\par -recommended strep pneumonia vaccines: Prevnar-13 vaccine, followed by Pneumo vaccine 23 one year following after 65\par -recommended early intervention for Upper Respiratory Infections (URIs)\par -recommended regular osteoporosis evaluations\par -recommended early dermatological evaluations\par -recommended after the age of 50 to the age of 70, colonoscopy every 5 years\par \par F/U in 6-8 weeks.\par He is encouraged to call with any changes, concerns, or questions\par

## 2022-12-29 NOTE — REASON FOR VISIT
[Follow-Up] : a follow-up visit [TextBox_44] : via video call-Eosinophilic asthma, allergies/ sinus, (+) remote sarcoidosis, left pleural effusion, r/o MIGUEL

## 2022-12-29 NOTE — HISTORY OF PRESENT ILLNESS
[Home] : at home, [unfilled] , at the time of the visit. [Medical Office: ([facility])___] : at the medical office located in  [Verbal consent obtained from patient] : the patient, [unfilled] [TextBox_4] : Mr. BENAVIDES is a 62 year old male with a history of Eosinophilic asthma, allergies/ sinus, ?remote sarcoidosis, left pleural effusion, ? MIGUEL presenting to the office today for pulmonary follow up via video call. His chief complaint is\par \par -he notes he has the flu\par -he notes he was given prednisone (3 days 2 pills, 5 days 1 pill)\par -he notes he has been taking Stiolto \par -he notes his blood pressure management has generally been better\par -he notes the first day of Sx was on 12/23\par -he notes he as given benzonatate\par -he notes his breathing is improving \par \par \par -patient denies any nausea, vomiting, chills, sweats, chest pain, chest pressure, palpitations, diarrhea, constipation, dysphagia, myalgias, dizziness, leg swelling, leg pain, itchy eyes, itchy ears

## 2022-12-30 RX ORDER — BLOOD-GLUCOSE METER
KIT MISCELLANEOUS
Qty: 1 | Refills: 0 | Status: ACTIVE | COMMUNITY
Start: 2022-12-30 | End: 1900-01-01

## 2022-12-30 RX ORDER — CICLESONIDE 80 UG/1
80 AEROSOL, METERED RESPIRATORY (INHALATION) TWICE DAILY
Qty: 1 | Refills: 3 | Status: ACTIVE | COMMUNITY
Start: 2022-12-02 | End: 1900-01-01

## 2023-01-05 ENCOUNTER — EMERGENCY (EMERGENCY)
Facility: HOSPITAL | Age: 63
LOS: 0 days | Discharge: HOME | End: 2023-01-05
Attending: EMERGENCY MEDICINE
Payer: COMMERCIAL

## 2023-01-05 VITALS — HEIGHT: 72 IN | WEIGHT: 225.09 LBS

## 2023-01-05 VITALS
OXYGEN SATURATION: 97 % | TEMPERATURE: 98 F | SYSTOLIC BLOOD PRESSURE: 147 MMHG | HEART RATE: 87 BPM | RESPIRATION RATE: 18 BRPM | DIASTOLIC BLOOD PRESSURE: 88 MMHG

## 2023-01-05 DIAGNOSIS — Z79.82 LONG TERM (CURRENT) USE OF ASPIRIN: ICD-10-CM

## 2023-01-05 DIAGNOSIS — I82.411 ACUTE EMBOLISM AND THROMBOSIS OF RIGHT FEMORAL VEIN: ICD-10-CM

## 2023-01-05 DIAGNOSIS — I48.91 UNSPECIFIED ATRIAL FIBRILLATION: ICD-10-CM

## 2023-01-05 DIAGNOSIS — R60.0 LOCALIZED EDEMA: ICD-10-CM

## 2023-01-05 DIAGNOSIS — M79.89 OTHER SPECIFIED SOFT TISSUE DISORDERS: ICD-10-CM

## 2023-01-05 DIAGNOSIS — M79.661 PAIN IN RIGHT LOWER LEG: ICD-10-CM

## 2023-01-05 DIAGNOSIS — J45.20 MILD INTERMITTENT ASTHMA, UNCOMPLICATED: ICD-10-CM

## 2023-01-05 LAB
ALBUMIN SERPL ELPH-MCNC: 3.8 G/DL — SIGNIFICANT CHANGE UP (ref 3.3–5)
ALP SERPL-CCNC: 62 U/L — SIGNIFICANT CHANGE UP (ref 40–120)
ALT FLD-CCNC: 34 U/L — SIGNIFICANT CHANGE UP (ref 12–78)
ANION GAP SERPL CALC-SCNC: 6 MMOL/L — SIGNIFICANT CHANGE UP (ref 5–17)
APTT BLD: 27.5 SEC — SIGNIFICANT CHANGE UP (ref 27.5–35.5)
AST SERPL-CCNC: 24 U/L — SIGNIFICANT CHANGE UP (ref 15–37)
BASOPHILS # BLD AUTO: 0.05 K/UL — SIGNIFICANT CHANGE UP (ref 0–0.2)
BASOPHILS NFR BLD AUTO: 0.4 % — SIGNIFICANT CHANGE UP (ref 0–2)
BILIRUB SERPL-MCNC: 0.8 MG/DL — SIGNIFICANT CHANGE UP (ref 0.2–1.2)
BUN SERPL-MCNC: 27 MG/DL — HIGH (ref 7–23)
CALCIUM SERPL-MCNC: 9.3 MG/DL — SIGNIFICANT CHANGE UP (ref 8.5–10.1)
CHLORIDE SERPL-SCNC: 107 MMOL/L — SIGNIFICANT CHANGE UP (ref 96–108)
CO2 SERPL-SCNC: 26 MMOL/L — SIGNIFICANT CHANGE UP (ref 22–31)
CREAT SERPL-MCNC: 1.31 MG/DL — HIGH (ref 0.5–1.3)
EGFR: 62 ML/MIN/1.73M2 — SIGNIFICANT CHANGE UP
EOSINOPHIL # BLD AUTO: 0.19 K/UL — SIGNIFICANT CHANGE UP (ref 0–0.5)
EOSINOPHIL NFR BLD AUTO: 1.7 % — SIGNIFICANT CHANGE UP (ref 0–6)
GLUCOSE SERPL-MCNC: 118 MG/DL — HIGH (ref 70–99)
HCT VFR BLD CALC: 42.5 % — SIGNIFICANT CHANGE UP (ref 39–50)
HGB BLD-MCNC: 14.1 G/DL — SIGNIFICANT CHANGE UP (ref 13–17)
IMM GRANULOCYTES NFR BLD AUTO: 0.4 % — SIGNIFICANT CHANGE UP (ref 0–0.9)
INR BLD: 1.16 RATIO — SIGNIFICANT CHANGE UP (ref 0.88–1.16)
LYMPHOCYTES # BLD AUTO: 2.52 K/UL — SIGNIFICANT CHANGE UP (ref 1–3.3)
LYMPHOCYTES # BLD AUTO: 22.3 % — SIGNIFICANT CHANGE UP (ref 13–44)
MCHC RBC-ENTMCNC: 28.7 PG — SIGNIFICANT CHANGE UP (ref 27–34)
MCHC RBC-ENTMCNC: 33.2 GM/DL — SIGNIFICANT CHANGE UP (ref 32–36)
MCV RBC AUTO: 86.6 FL — SIGNIFICANT CHANGE UP (ref 80–100)
MONOCYTES # BLD AUTO: 1.02 K/UL — HIGH (ref 0–0.9)
MONOCYTES NFR BLD AUTO: 9 % — SIGNIFICANT CHANGE UP (ref 2–14)
NEUTROPHILS # BLD AUTO: 7.49 K/UL — HIGH (ref 1.8–7.4)
NEUTROPHILS NFR BLD AUTO: 66.2 % — SIGNIFICANT CHANGE UP (ref 43–77)
PLATELET # BLD AUTO: 193 K/UL — SIGNIFICANT CHANGE UP (ref 150–400)
POTASSIUM SERPL-MCNC: 4.3 MMOL/L — SIGNIFICANT CHANGE UP (ref 3.5–5.3)
POTASSIUM SERPL-SCNC: 4.3 MMOL/L — SIGNIFICANT CHANGE UP (ref 3.5–5.3)
PROT SERPL-MCNC: 7.4 GM/DL — SIGNIFICANT CHANGE UP (ref 6–8.3)
PROTHROM AB SERPL-ACNC: 13.5 SEC — HIGH (ref 10.5–13.4)
RBC # BLD: 4.91 M/UL — SIGNIFICANT CHANGE UP (ref 4.2–5.8)
RBC # FLD: 12 % — SIGNIFICANT CHANGE UP (ref 10.3–14.5)
SODIUM SERPL-SCNC: 139 MMOL/L — SIGNIFICANT CHANGE UP (ref 135–145)
WBC # BLD: 11.31 K/UL — HIGH (ref 3.8–10.5)
WBC # FLD AUTO: 11.31 K/UL — HIGH (ref 3.8–10.5)

## 2023-01-05 PROCEDURE — 85610 PROTHROMBIN TIME: CPT

## 2023-01-05 PROCEDURE — 36415 COLL VENOUS BLD VENIPUNCTURE: CPT

## 2023-01-05 PROCEDURE — 85730 THROMBOPLASTIN TIME PARTIAL: CPT

## 2023-01-05 PROCEDURE — 85025 COMPLETE CBC W/AUTO DIFF WBC: CPT

## 2023-01-05 PROCEDURE — 99283 EMERGENCY DEPT VISIT LOW MDM: CPT

## 2023-01-05 PROCEDURE — 80053 COMPREHEN METABOLIC PANEL: CPT

## 2023-01-05 RX ORDER — APIXABAN 2.5 MG/1
10 TABLET, FILM COATED ORAL ONCE
Refills: 0 | Status: COMPLETED | OUTPATIENT
Start: 2023-01-05 | End: 2023-01-05

## 2023-01-05 RX ORDER — APIXABAN 2.5 MG/1
1 TABLET, FILM COATED ORAL
Qty: 74 | Refills: 0
Start: 2023-01-05

## 2023-01-05 RX ADMIN — APIXABAN 10 MILLIGRAM(S): 2.5 TABLET, FILM COATED ORAL at 21:46

## 2023-01-05 NOTE — ED ADULT NURSE NOTE - NSICDXPASTMEDICALHX_GEN_ALL_CORE_FT
PAST MEDICAL HISTORY:  Atrial fibrillation, unspecified type     Mild intermittent asthma with acute exacerbation

## 2023-01-05 NOTE — ED STATDOCS - NS ED ATTENDING STATEMENT MOD
This was a shared visit with the LANG. I reviewed and verified the documentation and independently performed the documented:

## 2023-01-05 NOTE — ED STATDOCS - OBJECTIVE STATEMENT
63 y/o M with PMH of a-fib not on AC, asthma presents with right LE swelling x 2 days. Pt was at his cardiologist being evaluated for a-fib, mentioned his leg swelling and was sent for outpatient US. US found DVT in mid femoral vein extending past knee. Denies numbness/tingling in lower extremities, CP, SOB. Of note states he had left UE DVT apx 1 year ago, unsure if he was on Eliquis or Xarelto, but was discontinued after 4 months. Denies recent trauma, prolonged travel, known malignancy.

## 2023-01-05 NOTE — ED STATDOCS - PROGRESS NOTE DETAILS
63 y/o M with PMH of a-fib not on AC, asthma presents with right LE swelling x 2 days. Pt was at his cardiologist being evaluated for a-fib, mentioned his leg swelling and was sent for outpatient US. US found DVT in mid femoral vein extending past knee. Denies numbness/tingling in lower extremities, CP, SOB. Of note states he had left UE DVT apx 1 year ago, unsure if he was on Eliquis or Xarelto, but was discontinued after 4 months. Denies recent trauma, prolonged travel, known malignancy. PE: Well appearing. Cardiac: irregularly irregular. Lungs: CTAB. Abdomen: NBS x4, soft, nontender. PV: +right LE edema, +right calf tenderness. 2+ DP and PT pulses bilaterally, sensation intact to light touch in lower extremities bilaterally. A/P: DVT confirmed on outpatient US, will obtain labs, expected dc with AC. Of Note: [id number] score: 0. - Camron Cutler PA-C 63 y/o M with PMH of a-fib not on AC, asthma presents with right LE swelling x 2 days. Pt was at his cardiologist being evaluated for a-fib, mentioned his leg swelling and was sent for outpatient US. US found DVT in mid femoral vein extending past knee. Denies numbness/tingling in lower extremities, CP, SOB. Of note states he had left UE DVT apx 1 year ago, unsure if he was on Eliquis or Xarelto, but was discontinued after 4 months. Denies recent trauma, prolonged travel, known malignancy. Cards: Dr. Bell in [place]. PE: Well appearing. Cardiac: irregularly irregular. Lungs: CTAB. Abdomen: NBS x4, soft, nontender. PV: +right LE edema, +right calf tenderness. 2+ DP and PT pulses bilaterally, sensation intact to light touch in lower extremities bilaterally. A/P: DVT confirmed on outpatient US, will obtain labs, expected dc with AC. Of Note: [id number] score: 0. - Camron Cutler PA-C Labs reviewed, GFR: 62, will start eliquis, dc home with starter pack. Advised to follow up with PCP or cardiologist prior to completion of starter pack to continue medication. Patient made aware, likely to require medication indefinitely as this is a recurrence of DVT. - Camron Cutler PA-C

## 2023-01-05 NOTE — ED STATDOCS - PATIENT PORTAL LINK FT
You can access the FollowMyHealth Patient Portal offered by [facility] by registering at the following website: [email or web address]. By joining [facility]’s FollowMyHealth portal, you will also be able to view your health information using other applications (apps) compatible with our system.

## 2023-01-05 NOTE — ED ADULT NURSE NOTE - CHIEF COMPLAINT QUOTE
patient c/o Right lower leg pain x few days.  patient had outpatient US showing "extensive" DVT in right leg.  hx a fib - on cardizem and baby ASA.

## 2023-01-10 ENCOUNTER — APPOINTMENT (OUTPATIENT)
Dept: PULMONOLOGY | Facility: CLINIC | Age: 63
End: 2023-01-10

## 2023-01-20 ENCOUNTER — APPOINTMENT (OUTPATIENT)
Dept: ORTHOPEDIC SURGERY | Facility: CLINIC | Age: 63
End: 2023-01-20
Payer: COMMERCIAL

## 2023-01-20 ENCOUNTER — LABORATORY RESULT (OUTPATIENT)
Age: 63
End: 2023-01-20

## 2023-01-20 VITALS — HEIGHT: 72 IN | WEIGHT: 217 LBS | BODY MASS INDEX: 29.39 KG/M2

## 2023-01-20 DIAGNOSIS — I51.9 HEART DISEASE, UNSPECIFIED: ICD-10-CM

## 2023-01-20 DIAGNOSIS — D16.22 BENIGN NEOPLASM OF LONG BONES OF LEFT LOWER LIMB: ICD-10-CM

## 2023-01-20 DIAGNOSIS — J45.909 UNSPECIFIED ASTHMA, UNCOMPLICATED: ICD-10-CM

## 2023-01-20 DIAGNOSIS — M10.062 IDIOPATHIC GOUT, LEFT KNEE: ICD-10-CM

## 2023-01-20 PROCEDURE — 99214 OFFICE O/P EST MOD 30 MIN: CPT | Mod: 25

## 2023-01-20 PROCEDURE — 20610 DRAIN/INJ JOINT/BURSA W/O US: CPT | Mod: LT

## 2023-01-20 PROCEDURE — 73562 X-RAY EXAM OF KNEE 3: CPT | Mod: LT

## 2023-01-20 NOTE — HISTORY OF PRESENT ILLNESS
[10] : 10 [0] : 0 [Sharp] : sharp [Throbbing] : throbbing [Meds] : meds [Sitting] : sitting [Standing] : standing [Bending forward] : bending forward [Extending back] : extending back [Stairs] : stairs [Lying in bed] : lying in bed [Retired] : Work status: retired [de-identified] : 1/20/23  Return visit for this 62 year male w/ hx of gout, c/o spon. onset of lt knee pain x last 1 months duration. Limping. Using crutches. NO hx of trauma. Saw his PCP on 1/10/23 and was started on colchicine x 3 days w/ no improvement. had a uric acid level done which was about 8.0. No fever or chills.  Restarted colchicine just today.  Says he was on the treadmill and felt some tenderness above his knee. \par \par PMH: hx of DVT rt leg x last 1 month - on Eliquis\par        \par   Hx of previous gout lt knee\par \par RIGHT LE VENOUS DOPPLER:\par IMPRESSION:\par 1.  Extensive acute right sided lower extremity DVT involving the mid and distal right femoral vein, popliteal vein and posterior tibial veins, as well as the gastrocnemius vein in the calf. \par 2.  Follow-up posttreatment ultrasound imaging is recommended to assess interval change.\par ____________________________________ [] : no [FreeTextEntry1] : left knee [de-identified] : t [de-identified] : 1/10/23 [de-identified] : dr Ramirez

## 2023-01-20 NOTE — DISCUSSION/SUMMARY
[de-identified] : "Written by Codi Stock, acting as Scribe for Nicholas Busby MD."\par \par Dr. Busby - \par The documentation recorded by the scribe accurately reflects the service I personally performed and the decisions made by me.

## 2023-01-20 NOTE — PHYSICAL EXAM
[Normal Mood and Affect] : normal mood and affect [Able to Communicate] : able to communicate [Well Developed] : well developed [Well Nourished] : well nourished [NL (0)] : extension 0 degrees [Left] : left knee [AP] : anteroposterior [Lateral] : lateral [Blacktail] : skyline [There are no fractures, subluxations or dislocations. No significant abnormalities are seen] : There are no fractures, subluxations or dislocations. No significant abnormalities are seen [] : negative Lachmann [FreeTextEntry3] : Slight warmth [FreeTextEntry9] : Small calcific traction spurs quadriceps and patellar tendons.  Osteochondroma medial tibial plateau. [TWNoteComboBox7] : flexion 90 degrees

## 2023-01-20 NOTE — REASON FOR VISIT
[FreeTextEntry2] : Left knee pain and swelling past 1 month.. Seen by Dr Ramirez -[facility]- [place] 1/10/23.Labs were done where uric acid high  and  was  of Gout. return.

## 2023-01-20 NOTE — PROCEDURE
[Large Joint Injection] : Large joint injection [Left] : of the left [Knee] : knee [Pain] : pain [Inflammation] : inflammation [Betadine] : betadine [Ethyl Chloride sprayed topically] : ethyl chloride sprayed topically [___ cc    1%] : Lidocaine ~Vcc of 1%  [___ cc    40mg] : Methylprednisolone (Depomedrol) ~Vcc of 40 mg  [Effusion] : effusion [Cell count/dif] : cell count/dif [GS] : GS [Culture] : culture [Crystals] : crystals [] : Patient tolerated procedure well [Call if redness, pain or fever occur] : call if redness, pain or fever occur [Apply ice for 15min out of every hour for the next 12-24 hours as tolerated] : apply ice for 15 minutes out of every hour for the next 12-24 hours as tolerated [Patient was advised to rest the joint(s) for ____ days] : patient was advised to rest the joint(s) for [unfilled] days [Risks, benefits, alternatives discussed / Verbal consent obtained] : the risks benefits, and alternatives have been discussed, and verbal consent was obtained [de-identified] : 27 cc [de-identified] : yellow, mildly cloudy

## 2023-01-21 LAB
B PERT IGG+IGM PNL SER: ABNORMAL
COLOR FLD: YELLOW
EOSINOPHIL # FLD MANUAL: 0 %
FLUID INTAKE SUBSTANCE CLASS: NORMAL
LYMPHOCYTES # FLD MANUAL: 14 %
MESOTHL CELL NFR FLD: 0 %
MONOS+MACROS NFR FLD MANUAL: 20 %
NEUTS SEG # FLD MANUAL: 66 %
NRBC # FLD: 0 %
RBC # FLD MANUAL: 1000 /UL
SYCRY CLARITY: ABNORMAL
SYCRY COLOR: YELLOW
SYCRY ID: ABNORMAL
SYCRY TUBE: NORMAL
TOTAL CELLS COUNTED FLD: 1921 /UL
TUBE TYPE: NORMAL
UNIDENT CELLS NFR FLD MANUAL: 0 %
VARIANT LYMPHS # FLD MANUAL: 0 %

## 2023-01-23 ENCOUNTER — APPOINTMENT (OUTPATIENT)
Dept: ORTHOPEDIC SURGERY | Facility: CLINIC | Age: 63
End: 2023-01-23
Payer: COMMERCIAL

## 2023-01-23 PROCEDURE — 99214 OFFICE O/P EST MOD 30 MIN: CPT

## 2023-01-24 ENCOUNTER — APPOINTMENT (OUTPATIENT)
Dept: ORTHOPEDIC SURGERY | Facility: CLINIC | Age: 63
End: 2023-01-24

## 2023-01-24 NOTE — DATA REVIEWED
[Other: _____] : [unfilled] [FreeTextEntry1] : LEFT KNEE ASPIRATION: positive for monosodium urate crystals consistent with gout

## 2023-01-24 NOTE — IMAGING
[de-identified] : The patient is a well appearing 62 year  old male of their stated age. \par Patient ambulates with a normal gait. \par Negative straight leg raise bilateral \par \par Effected Knee:                         	 \par ROM:  0-145 degrees \par Lachman: Negative \par Pivot Shift: Negative \par Anterior Drawer: Negative \par Posterior Drawer / Sag:Negative \par Varus Stress 0 degrees: Stable \par Varus Stress 30 degrees: Stable \par Valgus Stress 0 degrees: Stable \par Valgus Stress 30 degrees: Stable \par Medial Nata: Negative \par Lateral Nata: Negative \par Patella Glide: 2+ \par Patella Apprehension: Negative \par Patella Grind: POSITIVE \par \par Palpation: \par Medial Joint Line: Nontender \par Lateral Joint Line: Nontender \par Medial Collateral Ligament: Nontender \par Lateral Collateral Ligament/PLC: Nontender \par Distal Femur: Nontender \par Proximal Tibia: Nontender \par Tibial Tubercle: Nontender \par Distal Pole Patella: Nontender \par Quadriceps Tendon: Nontender &  Intact \par Patella Tendon: Nontender &  Intact \par Medial Femoral Condyle: Nontender \par Medial Distal Hamstring/PES: Nontender \par Lateral Distal Hamstring: Nontender & Stable \par Iliotibial Band: Nontender \par Medial Patellofemoral Ligament: Nontender \par Adductor: Nontender \par Proximal GSC-Plantaris: Nontender \par Calf: Supple & Nontender \par \par Inspection: \par Deformity: No \par Erythema: No \par Ecchymosis: No \par Abrasions: No \par Effusion: MODERATE \par Prepatella Bursitis: No \par Neurologic Exam: \par Sensation L4-S1: Grossly Intact \par Motor Exam: \par Quadriceps: 4 out of 5 \par Hamstrings: 5 out of 5 \par EHL: 5 out of 5 \par FHL: 5 out of 5 \par TA: 5 out of 5 \par GS: 5 out of 5 \par Circulatory/Pulses: \par Dorsalis Pedis: 2+ \par Posterior Tibialis: 2+ \par Additional Pertinent Findings: None \par \par Contralateral Knee:                           	 \par ROM: 0-145 degrees \par Other Pertinent Findings: None \par \par Assessment: The patient is a 62 year  old male  with left knee pain and radiographic and physical exam findings consistent with gout.   \par The patient’s condition is acute \par Documents/Results Reviewed Today: Left knee aspiration labs \par Tests/Studies Independently Interpreted Today: left knee aspiration labs reveals positive for monosodium urate crystals consistent with gout\par Pertinent findings include:  4/5 quad, +pf grind, moderate effusion\par Confounding medical conditions/concerns: None\par \par Plan: Discussed at length the medications that the patient is currently taking and which medications he should be taking for his diagnosis.  Advised patient that he needs to follow up with his PCP regarding gout diagnosis, further evaluation, and other laboratory results. \par Tests Ordered: None \par Prescription Medications Ordered: None\par Braces/DME Ordered: None \par Activity/Work/Sports Status: None \par Additional Instructions: None\par Follow-Up: PRN\par \par The patient's current medication management of their orthopedic diagnosis was reviewed today:\par (1) We discussed a comprehensive treatment plan that included possible pharmaceutical management involving the use of prescription strength medications including but not limited to options such as oral Naprosyn 500mg BID, once daily Meloxicam 15 mg, or 500-650 mg Tylenol versus over the counter oral medications and topical prescription NSAID Pennsaid vs over the counter Voltaren gel.\par (2) There is a moderate risk of morbidity with further treatment, especially from use of prescription strength medications and possible side effects of these medications which include upset stomach with oral medications, skin reactions to topical medications and cardiac/renal issues with long term use.\par (3) I recommended that the patient follow-up with their medical physician to discuss any significant specific potential issues with long term medication use such as interactions with current medications or with exacerbation of underlying medical comorbidities.\par (4) The benefits and risks associated with use of injectable, oral or topical, prescription and over the counter anti-inflammatory medications were discussed with the patient. The patient voiced understanding of the risks including but not limited to bleeding, stroke, kidney dysfunction, heart disease, and were referred to the black box warning label for further information.\par \par RAYMOND, Renita Luque, attest that this documentation has been prepared under the direction and in the presence of Provider Dr. Derik Munoz.\par \par The documentation recorded by the scribe accurately reflects the service I personally performed and the decisions made by me.\par

## 2023-03-23 ENCOUNTER — RX RENEWAL (OUTPATIENT)
Age: 63
End: 2023-03-23

## 2023-03-23 RX ORDER — BUDESONIDE 0.5 MG/2ML
0.5 INHALANT ORAL TWICE DAILY
Qty: 360 | Refills: 1 | Status: ACTIVE | COMMUNITY
Start: 2022-12-29 | End: 1900-01-01

## 2023-03-27 ENCOUNTER — APPOINTMENT (OUTPATIENT)
Dept: PULMONOLOGY | Facility: CLINIC | Age: 63
End: 2023-03-27

## 2023-05-01 ENCOUNTER — APPOINTMENT (OUTPATIENT)
Dept: ORTHOPEDIC SURGERY | Facility: CLINIC | Age: 63
End: 2023-05-01
Payer: COMMERCIAL

## 2023-05-01 PROCEDURE — 72100 X-RAY EXAM L-S SPINE 2/3 VWS: CPT

## 2023-05-01 PROCEDURE — 73660 X-RAY EXAM OF TOE(S): CPT | Mod: 50

## 2023-05-01 PROCEDURE — 99214 OFFICE O/P EST MOD 30 MIN: CPT

## 2023-05-01 RX ORDER — APIXABAN 5 MG/1
TABLET, FILM COATED ORAL
Refills: 0 | Status: ACTIVE | COMMUNITY

## 2023-05-01 NOTE — PHYSICAL EXAM
[NL (30)] : right lateral bending 30 degrees [Flexion] : flexion [Extension] : extension [Bending to right] : bending to right [Normal Mood and Affect] : normal mood and affect [Able to Communicate] : able to communicate [Well Developed] : well developed [Well Nourished] : well nourished [Disc space narrowing] : Disc space narrowing [Bilateral] : toe bilaterally [Degenerative change] : Degenerative change [] : non-antalgic [FreeTextEntry9] : Hips rotate well without pain.  [FreeTextEntry1] : Multilevel DDD, worse at L1-2 and L2-3 with severe  narrowing and bone spurs. 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  Left - sesamoid and 1 MT OA.

## 2023-05-01 NOTE — DISCUSSION/SUMMARY
[de-identified] : "Written by Codi Stock, acting as Scribe for Nicholas Busby MD."\par \par Dr. Busby - \par The documentation recorded by the scribe accurately reflects the service I personally performed and the decisions made by me.

## 2023-07-27 ENCOUNTER — APPOINTMENT (OUTPATIENT)
Dept: ORTHOPEDIC SURGERY | Facility: CLINIC | Age: 63
End: 2023-07-27
Payer: COMMERCIAL

## 2023-07-27 PROCEDURE — 99213 OFFICE O/P EST LOW 20 MIN: CPT

## 2023-07-27 NOTE — HISTORY OF PRESENT ILLNESS
[de-identified] : 07/27/23:  Patient returns today c/o persistent LBP radiating to both groins. Never went for PT. never tried a MDP.\par \par 5/1/23  Return visit for this 63 year male here today for back pain that began after exercising on the treadmill a few months ago.  With some time the pain did improve, but over the last few days pain returned and worse than originally.  No leg pain. Laying down is okay.  Twisting and turning cause pain.  Using heating pads and Tylenol.  Takes Eliquis for previous DVT and no longer takes low dose aspirin.\par \par PMH:  DVT.  Gout in his knees and toes, and on Colchicine with flare-ups.  Still with elevated uric acid levels and flare-ups are frequent. Has seen a rheumatologist who wanted him to start Allopurinol, but he is hesitant.  Occasional lower back pain.

## 2023-08-11 ENCOUNTER — APPOINTMENT (OUTPATIENT)
Dept: MRI IMAGING | Facility: CLINIC | Age: 63
End: 2023-08-11
Payer: COMMERCIAL

## 2023-08-11 PROCEDURE — 72148 MRI LUMBAR SPINE W/O DYE: CPT

## 2023-08-17 ENCOUNTER — APPOINTMENT (OUTPATIENT)
Dept: ORTHOPEDIC SURGERY | Facility: CLINIC | Age: 63
End: 2023-08-17
Payer: COMMERCIAL

## 2023-08-17 DIAGNOSIS — M50.221 OTHER CERVICAL DISC DISPLACEMENT AT C4-C5 LEVEL: ICD-10-CM
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
## 2023-08-17 PROCEDURE — 72100 X-RAY EXAM L-S SPINE 2/3 VWS: CPT

## 2023-08-17 PROCEDURE — 72040 X-RAY EXAM NECK SPINE 2-3 VW: CPT

## 2023-08-17 PROCEDURE — 99214 OFFICE O/P EST MOD 30 MIN: CPT

## 2023-08-17 RX ORDER — METHYLPREDNISOLONE 4 MG/1
4 TABLET ORAL
Qty: 1 | Refills: 1 | Status: ACTIVE | COMMUNITY
Start: 2023-08-17 | End: 1900-01-01

## 2023-08-17 NOTE — PHYSICAL EXAM
[Normal Mood and Affect] : normal mood and affect [Able to Communicate] : able to communicate [Well Developed] : well developed [Well Nourished] : well nourished [NL (30)] : right lateral bending 30 degrees [Bending to right] : bending to right [NL (45)] : forward flexion 45 degrees [Flexion] : flexion [Extension] : extension [Disc space narrowing] : Disc space narrowing [de-identified] : extension 30 degrees [de-identified] : left lateral rotation 45 degrees [TWNoteComboBox6] : right lateral rotation 45 degrees [] : non-antalgic [FreeTextEntry9] : Hips rotate well without pain.  [FreeTextEntry1] : Multilevel DDD, worse at L1-2 and L2-3 with severe  narrowing and bone spurs. [TWNoteComboBox7] : forward flexion 75 degrees
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
## 2023-08-17 NOTE — DISCUSSION/SUMMARY
[de-identified] : "Written by Codi Stock, acting as Scribe for Nicholas Busby MD."  Dr. Busby -  The documentation recorded by the scribe accurately reflects the service I personally performed and the decisions made by me.

## 2023-08-17 NOTE — PLAN
[TextEntry] : We discussed at length with the patient the options for treatment.  We discussed conservative care including physical therapy, acupuncture, massage therapy, and chiropractic care.  We discussed injection therapy and even surgical intervention should the patient fail conservative care.  We discussed risks, benefits, complications, alternatives, outcomes, expectations.  All questions answered.   Patient is being referred for physical therapy for various modalities for his neck and lower back.   Medrol dose pack was prescribed. Risks and benefits were explained including but not limited to the possibilities of gastritis, indigestion, and other GI side effects. It was also explained that in patients with a history of diabetes mellitus, it may temporarily elevate their blood sugars which should be monitored at home. A refill was also prescribed to take the subsequent week if needed.   He will decide if he wants to take this as he is concerned about blood clots.
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
## 2023-08-22 ENCOUNTER — APPOINTMENT (OUTPATIENT)
Dept: ORTHOPEDIC SURGERY | Facility: CLINIC | Age: 63
End: 2023-08-22
Payer: COMMERCIAL

## 2023-08-22 VITALS — HEIGHT: 72 IN | BODY MASS INDEX: 29.39 KG/M2 | WEIGHT: 217 LBS

## 2023-08-22 PROCEDURE — 73080 X-RAY EXAM OF ELBOW: CPT | Mod: RT

## 2023-08-22 PROCEDURE — 99214 OFFICE O/P EST MOD 30 MIN: CPT

## 2023-08-22 RX ORDER — CLINDAMYCIN HYDROCHLORIDE 300 MG/1
300 CAPSULE ORAL TWICE DAILY
Qty: 40 | Refills: 0 | Status: ACTIVE | COMMUNITY
Start: 2023-08-22 | End: 1900-01-01

## 2023-08-22 NOTE — PHYSICAL EXAM
[Normal Mood and Affect] : normal mood and affect [Able to Communicate] : able to communicate [Well Developed] : well developed [Well Nourished] : well nourished [Right] : right elbow [] : erythema [Pain with Flexion] : pain with flexion [NL (0)] : extension 0 degrees [NL (90)] : supination 90 degrees [5___] : extension 5[unfilled]/5 [There are no fractures, subluxations or dislocations. No significant abnormalities are seen] : There are no fractures, subluxations or dislocations. No significant abnormalities are seen [FreeTextEntry3] : swelling and erythema localized to olecranon bursa. No fluid accumulation. [FreeTextEntry1] : small spur off tip of the olecranon  [TWNoteComboBox7] : flexion 90 degrees

## 2023-08-22 NOTE — PLAN
[TextEntry] : The patient was advised of the diagnosis. The natural history of the pathology was explained in full to the patient in layman's terms. All questions were answered. The risks and benefits of surgical and non-surgical treatment alternatives were explained in full to the patient. Warm soaks daily  Start Duricef 500mg bid x 10 days
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
## 2023-08-24 ENCOUNTER — APPOINTMENT (OUTPATIENT)
Dept: ORTHOPEDIC SURGERY | Facility: CLINIC | Age: 63
End: 2023-08-24
Payer: COMMERCIAL

## 2023-08-24 VITALS — HEIGHT: 72 IN | BODY MASS INDEX: 28.44 KG/M2 | WEIGHT: 210 LBS

## 2023-08-24 DIAGNOSIS — M10.9 GOUT, UNSPECIFIED: ICD-10-CM

## 2023-08-24 PROCEDURE — 99213 OFFICE O/P EST LOW 20 MIN: CPT

## 2023-08-24 RX ORDER — CEFADROXIL 500 MG/1
500 CAPSULE ORAL TWICE DAILY
Qty: 10 | Refills: 1 | Status: ACTIVE | COMMUNITY
Start: 2023-08-22 | End: 1900-01-01

## 2023-08-24 NOTE — PHYSICAL EXAM
[Normal Mood and Affect] : normal mood and affect [Able to Communicate] : able to communicate [Well Developed] : well developed [Well Nourished] : well nourished [Right] : right elbow [] : erythema [Pain with Flexion] : pain with flexion [NL (0)] : extension 0 degrees [NL (90)] : supination 90 degrees [5___] : extension 5[unfilled]/5 [There are no fractures, subluxations or dislocations. No significant abnormalities are seen] : There are no fractures, subluxations or dislocations. No significant abnormalities are seen [FreeTextEntry3] : swelling and erythema localized to olecranon bursa is less and resolving. [FreeTextEntry1] : small spur off tip of the olecranon  [TWNoteComboBox7] : flexion 130 degrees
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
## 2023-08-24 NOTE — PLAN
[TextEntry] : The patient was advised of the diagnosis. The natural history of the pathology was explained in full to the patient in layman's terms. All questions were answered. The risks and benefits of surgical and non-surgical treatment alternatives were explained in full to the patient.  Pt experiencing side effects from cleocin, will try to switch to duricef. Pt understands there is only a 10-15% chance of being allergic to it.  Continue colchicine.

## 2023-08-29 ENCOUNTER — APPOINTMENT (OUTPATIENT)
Dept: ORTHOPEDIC SURGERY | Facility: CLINIC | Age: 63
End: 2023-08-29
Payer: COMMERCIAL

## 2023-08-29 VITALS — BODY MASS INDEX: 28.44 KG/M2 | WEIGHT: 210 LBS | HEIGHT: 72 IN

## 2023-08-29 DIAGNOSIS — L03.113 CELLULITIS OF RIGHT UPPER LIMB: ICD-10-CM

## 2023-08-29 PROCEDURE — 99214 OFFICE O/P EST MOD 30 MIN: CPT

## 2023-08-29 NOTE — PHYSICAL EXAM
[Normal Mood and Affect] : normal mood and affect [Able to Communicate] : able to communicate [Well Developed] : well developed [Well Nourished] : well nourished [NL (45)] : forward flexion 45 degrees [NL (30)] : right lateral bending 30 degrees [Flexion] : flexion [Extension] : extension [Bending to right] : bending to right [Disc space narrowing] : Disc space narrowing [Right] : right elbow [Pain with Flexion] : pain with flexion [NL (0)] : extension 0 degrees [NL (90)] : supination 90 degrees [5___] : extension 5[unfilled]/5 [There are no fractures, subluxations or dislocations. No significant abnormalities are seen] : There are no fractures, subluxations or dislocations. No significant abnormalities are seen [de-identified] : extension 30 degrees [de-identified] : left lateral rotation 45 degrees [TWNoteComboBox6] : right lateral rotation 45 degrees [] : non-antalgic [FreeTextEntry9] : Hips rotate well without pain.  [FreeTextEntry3] : swelling and erythema localized to olecranon bursa is less and resolving. [FreeTextEntry1] : small spur off tip of the olecranon  [TWNoteComboBox7] : flexion 130 degrees

## 2023-08-29 NOTE — PLAN
[TextEntry] : We discussed at length with the patient the options for treatment.  We discussed conservative care including physical therapy, acupuncture, massage therapy, and chiropractic care.  We discussed injection therapy and even surgical intervention should the patient fail conservative care.  We discussed risks, benefits, complications, alternatives, outcomes, expectations.  All questions answered.  Patient was advised to continue with physical therapy.  Has a MDP at home to use prn.
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
## 2023-09-14 ENCOUNTER — APPOINTMENT (OUTPATIENT)
Dept: ORTHOPEDIC SURGERY | Facility: CLINIC | Age: 63
End: 2023-09-14
Payer: COMMERCIAL

## 2023-09-14 VITALS — HEIGHT: 72 IN | BODY MASS INDEX: 42.66 KG/M2 | WEIGHT: 315 LBS

## 2023-09-14 DIAGNOSIS — Z86.718 PERSONAL HISTORY OF OTHER VENOUS THROMBOSIS AND EMBOLISM: ICD-10-CM

## 2023-09-14 PROCEDURE — 99213 OFFICE O/P EST LOW 20 MIN: CPT

## 2023-09-25 ENCOUNTER — APPOINTMENT (OUTPATIENT)
Dept: ORTHOPEDIC SURGERY | Facility: CLINIC | Age: 63
End: 2023-09-25

## 2023-09-26 ENCOUNTER — NON-APPOINTMENT (OUTPATIENT)
Age: 63
End: 2023-09-26

## 2023-10-19 NOTE — DISCHARGE NOTE PROVIDER - PROVIDER TOKENS
PROVIDER:[TOKEN:[[id number]],FOLLOWUP:[1-3 days]],PROVIDER:[TOKEN:[[id number]],FOLLOWUP:[1 week]],PROVIDER:[TOKEN:[[id number]],FOLLOWUP:[1 week]]
History/Exam/Medical Decision Making

## 2023-10-23 ENCOUNTER — NON-APPOINTMENT (OUTPATIENT)
Age: 63
End: 2023-10-23

## 2023-10-23 ENCOUNTER — APPOINTMENT (OUTPATIENT)
Dept: ORTHOPEDIC SURGERY | Facility: CLINIC | Age: 63
End: 2023-10-23
Payer: COMMERCIAL

## 2023-10-23 VITALS — BODY MASS INDEX: 29.8 KG/M2 | HEIGHT: 72 IN | WEIGHT: 220 LBS

## 2023-10-23 PROCEDURE — 99214 OFFICE O/P EST MOD 30 MIN: CPT

## 2023-11-20 ENCOUNTER — APPOINTMENT (OUTPATIENT)
Dept: ORTHOPEDIC SURGERY | Facility: CLINIC | Age: 63
End: 2023-11-20
Payer: COMMERCIAL

## 2023-11-20 VITALS — WEIGHT: 220 LBS | HEIGHT: 72 IN | BODY MASS INDEX: 29.8 KG/M2

## 2023-11-20 PROCEDURE — 99214 OFFICE O/P EST MOD 30 MIN: CPT

## 2023-11-30 ENCOUNTER — OFFICE (OUTPATIENT)
Dept: URBAN - METROPOLITAN AREA CLINIC 12 | Facility: CLINIC | Age: 63
Setting detail: OPHTHALMOLOGY
End: 2023-11-30
Payer: COMMERCIAL

## 2023-11-30 DIAGNOSIS — H25.13: ICD-10-CM

## 2023-11-30 DIAGNOSIS — H43.812: ICD-10-CM

## 2023-11-30 DIAGNOSIS — H43.393: ICD-10-CM

## 2023-11-30 PROCEDURE — 99214 OFFICE O/P EST MOD 30 MIN: CPT | Performed by: OPHTHALMOLOGY

## 2023-11-30 ASSESSMENT — REFRACTION_AUTOREFRACTION
OS_AXIS: 072
OD_SPHERE: UNABLE
OS_SPHERE: +23.75
OS_CYLINDER: -0.50

## 2023-11-30 ASSESSMENT — SPHEQUIV_DERIVED
OD_SPHEQUIV: 0.875
OS_SPHEQUIV: 23.5
OS_SPHEQUIV: 1.375

## 2023-11-30 ASSESSMENT — REFRACTION_MANIFEST
OS_AXIS: 123
OD_CYLINDER: -0.25
OS_SPHERE: +1.75
OS_CYLINDER: -0.75
OD_VA1: 20/80
OD_SPHERE: +1.00
OD_AXIS: 063
OS_VA1: 20/NI

## 2023-11-30 ASSESSMENT — CONFRONTATIONAL VISUAL FIELD TEST (CVF)
OD_FINDINGS: FULL
OS_FINDINGS: FULL

## 2023-12-07 ENCOUNTER — NON-APPOINTMENT (OUTPATIENT)
Age: 63
End: 2023-12-07

## 2023-12-07 RX ORDER — APIXABAN 5 MG/1
5 TABLET, FILM COATED ORAL DAILY
Refills: 0 | Status: ACTIVE | COMMUNITY

## 2023-12-07 RX ORDER — INDOMETHACIN 50 MG/1
50 CAPSULE ORAL 3 TIMES DAILY
Refills: 0 | Status: ACTIVE | COMMUNITY

## 2023-12-07 RX ORDER — ALPRAZOLAM 0.5 MG/1
0.5 TABLET ORAL
Refills: 0 | Status: ACTIVE | COMMUNITY

## 2023-12-07 RX ORDER — NEBIVOLOL HYDROCHLORIDE 5 MG/1
5 TABLET ORAL
Refills: 0 | Status: ACTIVE | COMMUNITY

## 2023-12-07 RX ORDER — PANTOPRAZOLE 40 MG/1
40 TABLET, DELAYED RELEASE ORAL
Refills: 0 | Status: ACTIVE | COMMUNITY

## 2023-12-07 RX ORDER — BETAMETHASONE DIPROPIONATE 0.5 MG/G
0.05 CREAM TOPICAL
Refills: 0 | Status: ACTIVE | COMMUNITY

## 2023-12-07 RX ORDER — DILTIAZEM HYDROCHLORIDE 120 MG/1
120 CAPSULE, COATED, EXTENDED RELEASE ORAL DAILY
Refills: 0 | Status: ACTIVE | COMMUNITY

## 2023-12-07 RX ORDER — FLUTICASONE PROPIONATE 50 MCG
50 SPRAY, SUSPENSION NASAL
Refills: 0 | Status: ACTIVE | COMMUNITY

## 2023-12-07 RX ORDER — DILTIAZEM HYDROCHLORIDE 240 MG/1
240 CAPSULE, COATED, EXTENDED RELEASE ORAL
Refills: 0 | Status: ACTIVE | COMMUNITY

## 2023-12-18 ENCOUNTER — OFFICE (OUTPATIENT)
Dept: URBAN - METROPOLITAN AREA CLINIC 12 | Facility: CLINIC | Age: 63
Setting detail: OPHTHALMOLOGY
End: 2023-12-18
Payer: COMMERCIAL

## 2023-12-18 DIAGNOSIS — H43.393: ICD-10-CM

## 2023-12-18 DIAGNOSIS — H25.13: ICD-10-CM

## 2023-12-18 DIAGNOSIS — H43.812: ICD-10-CM

## 2023-12-18 PROCEDURE — 99213 OFFICE O/P EST LOW 20 MIN: CPT | Performed by: OPHTHALMOLOGY

## 2023-12-18 ASSESSMENT — SPHEQUIV_DERIVED
OS_SPHEQUIV: 23.5
OS_SPHEQUIV: 1.375
OD_SPHEQUIV: 0.875

## 2023-12-18 ASSESSMENT — REFRACTION_MANIFEST
OD_SPHERE: +1.00
OD_VA1: 20/80
OS_SPHERE: +1.75
OS_AXIS: 123
OS_VA1: 20/25
OD_AXIS: 063
OD_CYLINDER: -0.25
OS_CYLINDER: -0.75

## 2023-12-18 ASSESSMENT — REFRACTION_CURRENTRX
OD_CYLINDER: SPHERE
OD_OVR_VA: 20/
OS_OVR_VA: 20/
OD_AXIS: 000
OD_SPHERE: +2.50
OS_SPHERE: +2.75
OS_AXIS: 090
OS_CYLINDER: -0.50

## 2023-12-18 ASSESSMENT — REFRACTION_AUTOREFRACTION
OS_AXIS: 072
OD_SPHERE: UNABLE
OS_SPHERE: +23.75
OS_CYLINDER: -0.50

## 2023-12-18 ASSESSMENT — CONFRONTATIONAL VISUAL FIELD TEST (CVF)
OS_FINDINGS: FULL
OD_FINDINGS: FULL

## 2023-12-28 ENCOUNTER — APPOINTMENT (OUTPATIENT)
Dept: CARDIOLOGY | Facility: CLINIC | Age: 63
End: 2023-12-28
Payer: COMMERCIAL

## 2023-12-28 VITALS
BODY MASS INDEX: 29.8 KG/M2 | DIASTOLIC BLOOD PRESSURE: 80 MMHG | HEIGHT: 72 IN | SYSTOLIC BLOOD PRESSURE: 149 MMHG | WEIGHT: 220 LBS

## 2023-12-28 PROCEDURE — 99214 OFFICE O/P EST MOD 30 MIN: CPT

## 2023-12-28 NOTE — DISCUSSION/SUMMARY
[Procedure Low Risk] : the procedure risk is low [Optimized for Surgery] : the patient is optimized for surgery [As per surgery] : as per surgery [FreeTextEntry3] : As per surgery [FreeTextEntry1] : He is stable from a cardiac perspective for his proposed procedure

## 2023-12-28 NOTE — PHYSICAL EXAM
[General Appearance - Well Developed] : well developed [Normal Appearance] : normal appearance [Well Groomed] : well groomed [General Appearance - Well Nourished] : well nourished [No Deformities] : no deformities [General Appearance - In No Acute Distress] : no acute distress [Respiration, Rhythm And Depth] : normal respiratory rhythm and effort [Exaggerated Use Of Accessory Muscles For Inspiration] : no accessory muscle use [Auscultation Breath Sounds / Voice Sounds] : lungs were clear to auscultation bilaterally [Heart Rate And Rhythm] : heart rate and rhythm were normal [Heart Sounds] : normal S1 and S2 [Murmurs] : no murmurs present [Nail Clubbing] : no clubbing of the fingernails [Cyanosis, Localized] : no localized cyanosis [Petechial Hemorrhages (___cm)] : no petechial hemorrhages [] : no ischemic changes

## 2023-12-28 NOTE — HISTORY OF PRESENT ILLNESS
[Preoperative Visit] : for a medical evaluation prior to surgery [Cardiovascular Disease] : cardiovascular disease [Electrocardiogram] : ~T an ECG ~C was performed [Echocardiogram] : ~T an echocardiogram ~C was performed [Cardiovascular Stress Test] : a cardiac stress test ~T ~C was performed [Good] : Good [Clotting Disorder] : clotting disorder [Anesthesia Reaction] : no anesthesia reaction [Bleeding Disorder] : no bleeding disorder [de-identified] : 2 venous thrombostic episodes of unclear etiology [FreeTextEntry1] : The patient is scheduled for a cataract surgery he is w/o active complaints.  His BP is elevated but in the 130/80 range at home His afib has been quiescent. [id number] score=1 He can stop Eliquis for a few days prior to the procedure if deemed necessary by his Ophthalmologist

## 2024-01-02 ENCOUNTER — APPOINTMENT (OUTPATIENT)
Dept: ORTHOPEDIC SURGERY | Facility: CLINIC | Age: 64
End: 2024-01-02
Payer: COMMERCIAL

## 2024-01-02 VITALS — WEIGHT: 220 LBS | HEIGHT: 72 IN | BODY MASS INDEX: 29.8 KG/M2

## 2024-01-02 PROCEDURE — 99214 OFFICE O/P EST MOD 30 MIN: CPT

## 2024-01-02 NOTE — PHYSICAL EXAM
[Normal Mood and Affect] : normal mood and affect [Able to Communicate] : able to communicate [Well Developed] : well developed [Well Nourished] : well nourished [NL (45)] : forward flexion 45 degrees [de-identified] : extension 30 degrees [de-identified] : left lateral rotation 45 degrees [TWNoteComboBox6] : right lateral rotation 45 degrees [NL (30)] : right lateral bending 30 degrees [Flexion] : flexion [Extension] : extension [Bending to right] : bending to right [] : patient ambulates without assistive device [Disc space narrowing] : Disc space narrowing [FreeTextEntry9] : Hips rotate well without pain.  [FreeTextEntry1] : Multilevel DDD, worse at L1-2 and L2-3 with severe  narrowing and bone spurs. [TWNoteComboBox7] : forward flexion 75 degrees
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
## 2024-01-02 NOTE — PLAN
[TextEntry] : We discussed at length with the patient the options for treatment.  We discussed conservative care including physical therapy, acupuncture, massage therapy, and chiropractic care.  We discussed injection therapy and even surgical intervention should the patient fail conservative care.  We discussed risks, benefits, complications, alternatives, outcomes, expectations.  All questions answered.  Patient was advised to continue with physical therapy.

## 2024-01-08 ENCOUNTER — OFFICE (OUTPATIENT)
Dept: URBAN - METROPOLITAN AREA CLINIC 12 | Facility: CLINIC | Age: 64
Setting detail: OPHTHALMOLOGY
End: 2024-01-08
Payer: COMMERCIAL

## 2024-01-08 DIAGNOSIS — H25.13: ICD-10-CM

## 2024-01-08 DIAGNOSIS — H25.11: ICD-10-CM

## 2024-01-08 PROCEDURE — 92136 OPHTHALMIC BIOMETRY: CPT | Mod: RT | Performed by: OPHTHALMOLOGY

## 2024-01-08 PROCEDURE — 99213 OFFICE O/P EST LOW 20 MIN: CPT | Performed by: OPHTHALMOLOGY

## 2024-01-08 ASSESSMENT — SPHEQUIV_DERIVED
OS_SPHEQUIV: 1.25
OS_SPHEQUIV: 1.375
OD_SPHEQUIV: -18.625
OD_SPHEQUIV: 0.875

## 2024-01-08 ASSESSMENT — REFRACTION_CURRENTRX
OD_SPHERE: +2.50
OS_AXIS: 090
OS_SPHERE: +2.75
OS_CYLINDER: -0.50
OD_OVR_VA: 20/
OD_CYLINDER: SPHERE
OS_OVR_VA: 20/
OD_AXIS: 000

## 2024-01-08 ASSESSMENT — REFRACTION_MANIFEST
OD_CYLINDER: -0.25
OD_SPHERE: +1.00
OD_VA1: 20/80
OS_VA1: 20/25
OS_AXIS: 123
OD_AXIS: 063
OS_SPHERE: +1.75
OS_CYLINDER: -0.75

## 2024-01-08 ASSESSMENT — CONFRONTATIONAL VISUAL FIELD TEST (CVF)
OS_FINDINGS: FULL
OD_FINDINGS: FULL

## 2024-01-08 ASSESSMENT — REFRACTION_AUTOREFRACTION
OS_CYLINDER: -1.00
OS_AXIS: 101
OD_SPHERE: -18.50
OD_CYLINDER: -0.25
OS_SPHERE: +1.75
OD_AXIS: 125

## 2024-01-10 ENCOUNTER — APPOINTMENT (OUTPATIENT)
Dept: CARDIOLOGY | Facility: CLINIC | Age: 64
End: 2024-01-10

## 2024-01-11 ENCOUNTER — APPOINTMENT (OUTPATIENT)
Dept: INTERNAL MEDICINE | Facility: CLINIC | Age: 64
End: 2024-01-11

## 2024-01-16 ENCOUNTER — ASC (OUTPATIENT)
Dept: URBAN - METROPOLITAN AREA SURGERY 8 | Facility: SURGERY | Age: 64
Setting detail: OPHTHALMOLOGY
End: 2024-01-16
Payer: COMMERCIAL

## 2024-01-16 DIAGNOSIS — H52.211: ICD-10-CM

## 2024-01-16 DIAGNOSIS — H25.11: ICD-10-CM

## 2024-01-16 PROCEDURE — 66984 XCAPSL CTRC RMVL W/O ECP: CPT | Mod: RT | Performed by: OPHTHALMOLOGY

## 2024-01-16 PROCEDURE — FEMTO FEMTOSECOND LASER: Mod: GY | Performed by: OPHTHALMOLOGY

## 2024-01-17 ENCOUNTER — RX ONLY (RX ONLY)
Age: 64
End: 2024-01-17

## 2024-01-17 ENCOUNTER — OFFICE (OUTPATIENT)
Dept: URBAN - METROPOLITAN AREA CLINIC 12 | Facility: CLINIC | Age: 64
Setting detail: OPHTHALMOLOGY
End: 2024-01-17
Payer: COMMERCIAL

## 2024-01-17 DIAGNOSIS — Z96.1: ICD-10-CM

## 2024-01-17 DIAGNOSIS — H25.12: ICD-10-CM

## 2024-01-17 PROCEDURE — 99024 POSTOP FOLLOW-UP VISIT: CPT | Performed by: OPHTHALMOLOGY

## 2024-01-17 ASSESSMENT — REFRACTION_AUTOREFRACTION
OS_CYLINDER: -1.25
OS_AXIS: 111
OD_SPHERE: UNABLE
OS_SPHERE: +1.50

## 2024-01-17 ASSESSMENT — REFRACTION_MANIFEST
OS_AXIS: 123
OS_SPHERE: +1.75
OD_SPHERE: +1.00
OD_VA1: 20/80
OD_CYLINDER: -0.25
OD_AXIS: 063
OS_CYLINDER: -0.75
OS_VA1: 20/25

## 2024-01-17 ASSESSMENT — CORNEAL EDEMA CLINICAL DESCRIPTION: OD_CORNEALEDEMA: 2+

## 2024-01-17 ASSESSMENT — REFRACTION_CURRENTRX
OD_AXIS: 000
OS_AXIS: 090
OD_SPHERE: +2.50
OS_OVR_VA: 20/
OD_CYLINDER: SPHERE
OD_OVR_VA: 20/
OS_CYLINDER: -0.50
OS_SPHERE: +2.75

## 2024-01-17 ASSESSMENT — SPHEQUIV_DERIVED
OD_SPHEQUIV: 0.875
OS_SPHEQUIV: 0.875
OS_SPHEQUIV: 1.375

## 2024-01-17 ASSESSMENT — CONFRONTATIONAL VISUAL FIELD TEST (CVF)
OD_FINDINGS: FULL
OS_FINDINGS: FULL

## 2024-01-24 ENCOUNTER — OFFICE (OUTPATIENT)
Dept: URBAN - METROPOLITAN AREA CLINIC 12 | Facility: CLINIC | Age: 64
Setting detail: OPHTHALMOLOGY
End: 2024-01-24
Payer: COMMERCIAL

## 2024-01-24 DIAGNOSIS — H25.12: ICD-10-CM

## 2024-01-24 DIAGNOSIS — Z96.1: ICD-10-CM

## 2024-01-24 PROCEDURE — 99024 POSTOP FOLLOW-UP VISIT: CPT | Performed by: OPHTHALMOLOGY

## 2024-01-24 ASSESSMENT — REFRACTION_CURRENTRX
OD_AXIS: 000
OS_OVR_VA: 20/
OD_OVR_VA: 20/
OS_CYLINDER: -0.50
OD_CYLINDER: SPHERE
OS_SPHERE: +2.75
OD_SPHERE: +2.50
OS_AXIS: 090

## 2024-01-24 ASSESSMENT — REFRACTION_MANIFEST
OD_AXIS: 063
OS_VA1: 20/25
OD_CYLINDER: -0.25
OS_CYLINDER: -0.75
OS_SPHERE: +1.75
OD_VA1: 20/80
OS_AXIS: 123
OD_SPHERE: +1.00

## 2024-01-24 ASSESSMENT — REFRACTION_AUTOREFRACTION
OD_SPHERE: +0.50
OS_CYLINDER: -0.50
OD_AXIS: 030
OS_SPHERE: +1.00
OD_CYLINDER: -0.75
OS_AXIS: 179

## 2024-01-24 ASSESSMENT — SPHEQUIV_DERIVED
OS_SPHEQUIV: 1.375
OD_SPHEQUIV: 0.875
OD_SPHEQUIV: 0.125
OS_SPHEQUIV: 0.75

## 2024-01-24 ASSESSMENT — CONFRONTATIONAL VISUAL FIELD TEST (CVF)
OS_FINDINGS: FULL
OD_FINDINGS: FULL

## 2024-01-24 ASSESSMENT — CORNEAL EDEMA CLINICAL DESCRIPTION: OD_CORNEALEDEMA: ABSENT

## 2024-02-16 ENCOUNTER — APPOINTMENT (OUTPATIENT)
Dept: ORTHOPEDIC SURGERY | Facility: CLINIC | Age: 64
End: 2024-02-16
Payer: COMMERCIAL

## 2024-02-16 VITALS — BODY MASS INDEX: 30.48 KG/M2 | HEIGHT: 72 IN | WEIGHT: 225 LBS

## 2024-02-16 DIAGNOSIS — M51.36 OTHER INTERVERTEBRAL DISC DEGENERATION, LUMBAR REGION: ICD-10-CM

## 2024-02-16 PROCEDURE — 99214 OFFICE O/P EST MOD 30 MIN: CPT

## 2024-02-16 NOTE — PHYSICAL EXAM
[Normal Mood and Affect] : normal mood and affect [Able to Communicate] : able to communicate [Well Developed] : well developed [Well Nourished] : well nourished [NL (45)] : forward flexion 45 degrees [NL (30)] : right lateral bending 30 degrees [Flexion] : flexion [Extension] : extension [Bending to right] : bending to right [Disc space narrowing] : Disc space narrowing [de-identified] : extension 30 degrees [de-identified] : left lateral rotation 45 degrees [TWNoteComboBox6] : right lateral rotation 45 degrees [] : non-antalgic [FreeTextEntry9] : Hips rotate well without pain.  [FreeTextEntry1] : Multilevel DDD, worse at L1-2 and L2-3 with severe  narrowing and bone spurs. [TWNoteComboBox7] : forward flexion 75 degrees

## 2024-02-16 NOTE — PLAN
[TextEntry] : We discussed at length with the patient the options for treatment.  We discussed conservative care including physical therapy, acupuncture, massage therapy, and chiropractic care.  We discussed injection therapy and even surgical intervention should the patient fail conservative care.  We discussed risks, benefits, complications, alternatives, outcomes, expectations.  All questions answered.  Patient was advised to continue with physical therapy.  Prescribed massage therapy.
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
## 2024-02-20 ENCOUNTER — OFFICE (OUTPATIENT)
Dept: URBAN - METROPOLITAN AREA CLINIC 12 | Facility: CLINIC | Age: 64
Setting detail: OPHTHALMOLOGY
End: 2024-02-20
Payer: COMMERCIAL

## 2024-02-20 DIAGNOSIS — Z96.1: ICD-10-CM

## 2024-02-20 DIAGNOSIS — H25.12: ICD-10-CM

## 2024-02-20 PROCEDURE — 99024 POSTOP FOLLOW-UP VISIT: CPT | Performed by: STUDENT IN AN ORGANIZED HEALTH CARE EDUCATION/TRAINING PROGRAM

## 2024-02-20 ASSESSMENT — REFRACTION_CURRENTRX
OS_OVR_VA: 20/
OS_AXIS: 090
OD_AXIS: 000
OD_CYLINDER: SPHERE
OD_SPHERE: +2.50
OS_SPHERE: +2.75
OD_OVR_VA: 20/
OS_CYLINDER: -0.50

## 2024-02-20 ASSESSMENT — SPHEQUIV_DERIVED
OD_SPHEQUIV: 0.875
OS_SPHEQUIV: 1.375
OS_SPHEQUIV: 1
OD_SPHEQUIV: -0.125

## 2024-02-20 ASSESSMENT — REFRACTION_MANIFEST
OS_CYLINDER: -0.75
OD_CYLINDER: -0.25
OD_VA1: 20/80
OS_VA1: 20/25
OS_AXIS: 123
OD_AXIS: 063
OD_SPHERE: +1.00
OS_SPHERE: +1.75

## 2024-02-20 ASSESSMENT — REFRACTION_AUTOREFRACTION
OD_SPHERE: +0.25
OS_CYLINDER: -0.50
OS_AXIS: 115
OD_CYLINDER: -0.75
OS_SPHERE: +1.25
OD_AXIS: 032

## 2024-02-20 ASSESSMENT — CORNEAL EDEMA CLINICAL DESCRIPTION: OD_CORNEALEDEMA: ABSENT

## 2024-02-20 ASSESSMENT — CONFRONTATIONAL VISUAL FIELD TEST (CVF)
OS_FINDINGS: FULL
OD_FINDINGS: FULL

## 2024-02-26 ENCOUNTER — OFFICE (OUTPATIENT)
Dept: URBAN - METROPOLITAN AREA CLINIC 12 | Facility: CLINIC | Age: 64
Setting detail: OPHTHALMOLOGY
End: 2024-02-26
Payer: COMMERCIAL

## 2024-02-26 DIAGNOSIS — Z96.1: ICD-10-CM

## 2024-02-26 DIAGNOSIS — H25.12: ICD-10-CM

## 2024-02-26 PROCEDURE — 99024 POSTOP FOLLOW-UP VISIT: CPT | Performed by: OPHTHALMOLOGY

## 2024-02-26 ASSESSMENT — REFRACTION_CURRENTRX
OS_CYLINDER: -0.50
OS_SPHERE: +2.75
OD_CYLINDER: SPHERE
OS_AXIS: 090
OS_OVR_VA: 20/
OD_SPHERE: +2.50
OD_OVR_VA: 20/
OD_AXIS: 000

## 2024-02-26 ASSESSMENT — REFRACTION_MANIFEST
OS_AXIS: 123
OS_CYLINDER: -0.75
OD_CYLINDER: -0.25
OS_SPHERE: +1.75
OS_VA1: 20/25
OD_VA1: 20/80
OD_SPHERE: +1.00
OD_AXIS: 063

## 2024-02-26 ASSESSMENT — CORNEAL EDEMA CLINICAL DESCRIPTION: OD_CORNEALEDEMA: ABSENT

## 2024-02-26 ASSESSMENT — REFRACTION_AUTOREFRACTION
OS_AXIS: 100
OS_CYLINDER: -1.00
OD_SPHERE: PLANO
OD_AXIS: 39
OS_SPHERE: +2.00
OD_CYLINDER: -0.50

## 2024-02-26 ASSESSMENT — SPHEQUIV_DERIVED
OS_SPHEQUIV: 1.5
OD_SPHEQUIV: 0.875
OS_SPHEQUIV: 1.375

## 2024-02-26 ASSESSMENT — CONFRONTATIONAL VISUAL FIELD TEST (CVF)
OS_FINDINGS: FULL
OD_FINDINGS: FULL

## 2024-03-15 ENCOUNTER — NON-APPOINTMENT (OUTPATIENT)
Age: 64
End: 2024-03-15

## 2024-03-15 ENCOUNTER — APPOINTMENT (OUTPATIENT)
Dept: ORTHOPEDIC SURGERY | Facility: CLINIC | Age: 64
End: 2024-03-15
Payer: COMMERCIAL

## 2024-03-15 VITALS — WEIGHT: 225 LBS | BODY MASS INDEX: 30.48 KG/M2 | HEIGHT: 72 IN

## 2024-03-15 DIAGNOSIS — M50.323 OTHER CERVICAL DISC DEGENERATION AT C6-C7 LEVEL: ICD-10-CM

## 2024-03-15 DIAGNOSIS — M51.26 OTHER INTERVERTEBRAL DISC DISPLACEMENT, LUMBAR REGION: ICD-10-CM

## 2024-03-15 DIAGNOSIS — M50.321 OTHER CERVICAL DISC DEGENERATION AT C4-C5 LEVEL: ICD-10-CM

## 2024-03-15 DIAGNOSIS — M50.322 OTHER CERVICAL DISC DEGENERATION AT C5-C6 LEVEL: ICD-10-CM
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
## 2024-03-15 PROCEDURE — 99214 OFFICE O/P EST MOD 30 MIN: CPT
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
## 2024-03-15 NOTE — PLAN
[TextEntry] : The patient was advised of the diagnosis. The natural history of the pathology was explained in full to the patient in layman's terms. All questions were answered. The risks and benefits of surgical and non-surgical treatment alternatives were explained in full to the patient.  Patient was advised to continue with physical therapy.   Continue massage therapy neck and lower back, increase to twice a week.

## 2024-04-01 ENCOUNTER — OFFICE (OUTPATIENT)
Dept: URBAN - METROPOLITAN AREA CLINIC 12 | Facility: CLINIC | Age: 64
Setting detail: OPHTHALMOLOGY
End: 2024-04-01
Payer: COMMERCIAL

## 2024-04-01 DIAGNOSIS — H25.12: ICD-10-CM

## 2024-04-01 DIAGNOSIS — Z96.1: ICD-10-CM

## 2024-04-01 PROBLEM — H26.491 POSTERIOR CAPSULAR OPACIFICATION; RIGHT EYE: Status: ACTIVE | Noted: 2024-04-01

## 2024-04-01 PROCEDURE — 99024 POSTOP FOLLOW-UP VISIT: CPT | Performed by: OPHTHALMOLOGY

## 2024-04-08 ENCOUNTER — RESULT CHARGE (OUTPATIENT)
Age: 64
End: 2024-04-08

## 2024-04-09 ENCOUNTER — APPOINTMENT (OUTPATIENT)
Dept: INTERNAL MEDICINE | Facility: CLINIC | Age: 64
End: 2024-04-09
Payer: COMMERCIAL

## 2024-04-09 ENCOUNTER — NON-APPOINTMENT (OUTPATIENT)
Age: 64
End: 2024-04-09

## 2024-04-09 VITALS
HEIGHT: 72 IN | BODY MASS INDEX: 30.88 KG/M2 | DIASTOLIC BLOOD PRESSURE: 92 MMHG | WEIGHT: 228 LBS | OXYGEN SATURATION: 96 % | HEART RATE: 73 BPM | SYSTOLIC BLOOD PRESSURE: 147 MMHG

## 2024-04-09 DIAGNOSIS — Z00.00 ENCOUNTER FOR GENERAL ADULT MEDICAL EXAMINATION W/OUT ABNORMAL FINDINGS: ICD-10-CM

## 2024-04-09 DIAGNOSIS — I82.90 ACUTE EMBOLISM AND THROMBOSIS OF UNSPECIFIED VEIN: ICD-10-CM

## 2024-04-09 DIAGNOSIS — H93.19 TINNITUS, UNSPECIFIED EAR: ICD-10-CM

## 2024-04-09 DIAGNOSIS — J45.909 UNSPECIFIED ASTHMA, UNCOMPLICATED: ICD-10-CM

## 2024-04-09 PROCEDURE — G0444 DEPRESSION SCREEN ANNUAL: CPT | Mod: 59

## 2024-04-09 PROCEDURE — 99396 PREV VISIT EST AGE 40-64: CPT

## 2024-04-09 PROCEDURE — 93000 ELECTROCARDIOGRAM COMPLETE: CPT | Mod: 59

## 2024-04-09 PROCEDURE — 99213 OFFICE O/P EST LOW 20 MIN: CPT | Mod: 25

## 2024-04-09 RX ORDER — ALBUTEROL SULFATE 90 UG/1
108 (90 BASE) INHALANT RESPIRATORY (INHALATION)
Qty: 1 | Refills: 1 | Status: ACTIVE | COMMUNITY
Start: 2024-04-09 | End: 1900-01-01

## 2024-04-09 NOTE — PHYSICAL EXAM
[No Acute Distress] : no acute distress [Well Nourished] : well nourished [Well Developed] : well developed [Well-Appearing] : well-appearing [Normal Sclera/Conjunctiva] : normal sclera/conjunctiva [PERRL] : pupils equal round and reactive to light [EOMI] : extraocular movements intact [Normal Outer Ear/Nose] : the outer ears and nose were normal in appearance [Normal Oropharynx] : the oropharynx was normal [No JVD] : no jugular venous distention [No Lymphadenopathy] : no lymphadenopathy [Supple] : supple [Thyroid Normal, No Nodules] : the thyroid was normal and there were no nodules present [No Respiratory Distress] : no respiratory distress  [No Accessory Muscle Use] : no accessory muscle use [Clear to Auscultation] : lungs were clear to auscultation bilaterally [Normal Rate] : normal rate  [Regular Rhythm] : with a regular rhythm [Normal S1, S2] : normal S1 and S2 [No Murmur] : no murmur heard [No Carotid Bruits] : no carotid bruits [No Abdominal Bruit] : a ~M bruit was not heard ~T in the abdomen [No Varicosities] : no varicosities [Pedal Pulses Present] : the pedal pulses are present [No Edema] : there was no peripheral edema [No Palpable Aorta] : no palpable aorta [No Extremity Clubbing/Cyanosis] : no extremity clubbing/cyanosis [Soft] : abdomen soft [Non Tender] : non-tender [Non-distended] : non-distended [No Masses] : no abdominal mass palpated [No HSM] : no HSM [Normal Bowel Sounds] : normal bowel sounds [Normal Posterior Cervical Nodes] : no posterior cervical lymphadenopathy [Normal Anterior Cervical Nodes] : no anterior cervical lymphadenopathy [No CVA Tenderness] : no CVA  tenderness [No Spinal Tenderness] : no spinal tenderness [No Joint Swelling] : no joint swelling [Grossly Normal Strength/Tone] : grossly normal strength/tone [No Rash] : no rash [Coordination Grossly Intact] : coordination grossly intact [No Focal Deficits] : no focal deficits [Normal Gait] : normal gait [Deep Tendon Reflexes (DTR)] : deep tendon reflexes were 2+ and symmetric [Normal Affect] : the affect was normal [Normal Insight/Judgement] : insight and judgment were intact [de-identified] : obese

## 2024-04-09 NOTE — ASSESSMENT
[FreeTextEntry1] : Diet and exercise Check complete blood work Colonoscopy ophthalmology vitamin D  exam check PSA Depression screen done and reviewed 5 minutes Up-to-date on vaccines Overweight strict diet weight loss A-fib follow-up cardiology continue Eliquis Asthma to use albuterol inhaler HFA 2 puffs every 4 -6 hours as needed instructions given Venous thrombosis resolved continue Eliquis for A-fib Tinnitus refer to ENT for evaluation Follow-up 6 months

## 2024-04-09 NOTE — HISTORY OF PRESENT ILLNESS
[de-identified] : 64-year-old white female presents for complete checkup patient denies chest pain shortness of breath fever chills abdominal pain he is trying to lose weight he complains of tinnitus in both ears for which he has seen ENT he also complains of mild asthma symptoms

## 2024-04-09 NOTE — HEALTH RISK ASSESSMENT
[No] : No [No falls in past year] : Patient reported no falls in the past year [1] : 2) Feeling down, depressed, or hopeless for several days (1) [PHQ-2 Positive] : PHQ-2 Positive [PHQ-9 Negative - No further assessment needed] : PHQ-9 Negative - No further assessment needed [With Significant Other] : lives with significant other [Never] : Never [de-identified] : 5 min [Change in mental status noted] : No change in mental status noted [ColonoscopyDate] : 2022 [ColonoscopyComments] : virtual  [de-identified] : retired

## 2024-04-10 LAB
25(OH)D3 SERPL-MCNC: 43.1 NG/ML
ALBUMIN SERPL ELPH-MCNC: 5.1 G/DL
ALP BLD-CCNC: 57 U/L
ALT SERPL-CCNC: 25 U/L
ANION GAP SERPL CALC-SCNC: 12 MMOL/L
APPEARANCE: CLEAR
AST SERPL-CCNC: 30 U/L
BASOPHILS # BLD AUTO: 0.07 K/UL
BASOPHILS NFR BLD AUTO: 1.1 %
BILIRUB SERPL-MCNC: 0.6 MG/DL
BILIRUBIN URINE: NEGATIVE
BLOOD URINE: NEGATIVE
BUN SERPL-MCNC: 18 MG/DL
CALCIUM SERPL-MCNC: 9.8 MG/DL
CHLORIDE SERPL-SCNC: 103 MMOL/L
CHOLEST SERPL-MCNC: 251 MG/DL
CO2 SERPL-SCNC: 27 MMOL/L
COLOR: YELLOW
CREAT SERPL-MCNC: 1.15 MG/DL
EGFR: 71 ML/MIN/1.73M2
EOSINOPHIL # BLD AUTO: 0.24 K/UL
EOSINOPHIL NFR BLD AUTO: 3.8 %
ESTIMATED AVERAGE GLUCOSE: 120 MG/DL
GLUCOSE QUALITATIVE U: NEGATIVE MG/DL
GLUCOSE SERPL-MCNC: 95 MG/DL
HBA1C MFR BLD HPLC: 5.8 %
HCT VFR BLD CALC: 42.7 %
HDLC SERPL-MCNC: 57 MG/DL
HGB BLD-MCNC: 14.2 G/DL
IMM GRANULOCYTES NFR BLD AUTO: 0.3 %
KETONES URINE: NEGATIVE MG/DL
LDLC SERPL CALC-MCNC: 170 MG/DL
LEUKOCYTE ESTERASE URINE: NEGATIVE
LYMPHOCYTES # BLD AUTO: 2.22 K/UL
LYMPHOCYTES NFR BLD AUTO: 34.7 %
MAN DIFF?: NORMAL
MCHC RBC-ENTMCNC: 29.8 PG
MCHC RBC-ENTMCNC: 33.3 GM/DL
MCV RBC AUTO: 89.5 FL
MONOCYTES # BLD AUTO: 0.57 K/UL
MONOCYTES NFR BLD AUTO: 8.9 %
NEUTROPHILS # BLD AUTO: 3.28 K/UL
NEUTROPHILS NFR BLD AUTO: 51.2 %
NITRITE URINE: NEGATIVE
NONHDLC SERPL-MCNC: 194 MG/DL
PH URINE: 5.5
PLATELET # BLD AUTO: 202 K/UL
POTASSIUM SERPL-SCNC: 5.1 MMOL/L
PROT SERPL-MCNC: 7.3 G/DL
PROTEIN URINE: NEGATIVE MG/DL
PSA SERPL-MCNC: 1.54 NG/ML
RBC # BLD: 4.77 M/UL
RBC # FLD: 12.2 %
SODIUM SERPL-SCNC: 143 MMOL/L
SPECIFIC GRAVITY URINE: 1.02
TRIGL SERPL-MCNC: 137 MG/DL
TSH SERPL-ACNC: 1.52 UIU/ML
URATE SERPL-MCNC: 8.4 MG/DL
UROBILINOGEN URINE: 0.2 MG/DL
WBC # FLD AUTO: 6.4 K/UL

## 2024-05-06 ENCOUNTER — OFFICE (OUTPATIENT)
Dept: URBAN - METROPOLITAN AREA CLINIC 12 | Facility: CLINIC | Age: 64
Setting detail: OPHTHALMOLOGY
End: 2024-05-06
Payer: COMMERCIAL

## 2024-05-06 DIAGNOSIS — H25.12: ICD-10-CM

## 2024-05-06 DIAGNOSIS — H26.491: ICD-10-CM

## 2024-05-06 DIAGNOSIS — H43.812: ICD-10-CM

## 2024-05-06 DIAGNOSIS — Z96.1: ICD-10-CM

## 2024-05-06 DIAGNOSIS — H43.393: ICD-10-CM

## 2024-05-06 PROCEDURE — 99214 OFFICE O/P EST MOD 30 MIN: CPT | Performed by: OPHTHALMOLOGY

## 2024-05-06 ASSESSMENT — CONFRONTATIONAL VISUAL FIELD TEST (CVF)
OS_FINDINGS: FULL
OD_FINDINGS: FULL

## 2024-05-17 ENCOUNTER — APPOINTMENT (OUTPATIENT)
Dept: ORTHOPEDIC SURGERY | Facility: CLINIC | Age: 64
End: 2024-05-17
Payer: COMMERCIAL

## 2024-05-17 ENCOUNTER — APPOINTMENT (OUTPATIENT)
Dept: MRI IMAGING | Facility: CLINIC | Age: 64
End: 2024-05-17
Payer: COMMERCIAL

## 2024-05-17 VITALS — BODY MASS INDEX: 30.88 KG/M2 | WEIGHT: 228 LBS | HEIGHT: 72 IN

## 2024-05-17 VITALS — BODY MASS INDEX: 30.2 KG/M2 | WEIGHT: 223 LBS | HEIGHT: 72 IN

## 2024-05-17 DIAGNOSIS — Z87.39 PERSONAL HISTORY OF OTHER DISEASES OF THE MUSCULOSKELETAL SYSTEM AND CONNECTIVE TISSUE: ICD-10-CM

## 2024-05-17 DIAGNOSIS — S46.211A STRAIN OF MUSCLE, FASCIA AND TENDON OF OTHER PARTS OF BICEPS, RIGHT ARM, INITIAL ENCOUNTER: ICD-10-CM

## 2024-05-17 PROCEDURE — 99214 OFFICE O/P EST MOD 30 MIN: CPT | Mod: 25

## 2024-05-17 PROCEDURE — 73221 MRI JOINT UPR EXTREM W/O DYE: CPT | Mod: RT

## 2024-05-17 PROCEDURE — 73080 X-RAY EXAM OF ELBOW: CPT | Mod: RT

## 2024-05-17 RX ORDER — COLCHICINE 0.6 MG/1
0.6 TABLET ORAL
Qty: 60 | Refills: 1 | Status: ACTIVE | COMMUNITY
Start: 2024-05-17 | End: 1900-01-01

## 2024-05-17 RX ORDER — METHYLPREDNISOLONE 4 MG/1
4 TABLET ORAL
Qty: 1 | Refills: 1 | Status: ACTIVE | COMMUNITY
Start: 2024-05-17 | End: 1900-01-01

## 2024-05-17 NOTE — PLAN
[TextEntry] : The patient was advised of the diagnosis. The natural history of the pathology was explained in full to the patient in layman's terms. All questions were answered. The risks and benefits of surgical and non-surgical treatment alternatives were explained in full to the patient.   Will obtain a STAT MRI of the right elbow.  Referral made to ortho shoulder specialist. Arm sling.  Colchicine renewed for gout. He no longer takes Cardizem.  Medrol dose pack was prescribed. Risks and benefits were explained including but not limited to the possibilities of gastritis, indigestion, and other GI side effects. It was also explained that in patients with a history of diabetes mellitus, it may temporarily elevate their blood sugars which should be monitored at home. A refill was also prescribed to take the subsequent week if needed.
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
## 2024-05-17 NOTE — PHYSICAL EXAM
[NL (150)] : flexion 150 degrees [NL (0)] : extension 0 degrees [NL (90)] : supination 90 degrees [5___] : supination 5[unfilled]/5 [Normal Mood and Affect] : normal mood and affect [Able to Communicate] : able to communicate [Well Developed] : well developed [Well Nourished] : well nourished [Right] : right elbow [There are no fractures, subluxations or dislocations. No significant abnormalities are seen] : There are no fractures, subluxations or dislocations. No significant abnormalities are seen [] : no palpable distal biceps

## 2024-05-21 ENCOUNTER — APPOINTMENT (OUTPATIENT)
Dept: ORTHOPEDIC SURGERY | Facility: CLINIC | Age: 64
End: 2024-05-21

## 2024-05-21 ENCOUNTER — APPOINTMENT (OUTPATIENT)
Dept: ORTHOPEDIC SURGERY | Facility: CLINIC | Age: 64
End: 2024-05-21
Payer: COMMERCIAL

## 2024-05-21 PROCEDURE — 99203 OFFICE O/P NEW LOW 30 MIN: CPT

## 2024-05-26 NOTE — ASSESSMENT
[FreeTextEntry1] : Right distal biceps tear - reviewed radiographs and MRI as ordered by an outside provider. We discussed nonoperative vs operative options and course. We discussed expected loss of 10-20% elbow flexion strength and 30-40% supination strength with risk of cramping and persistent contour loss with nonoperative management. We came to mutual agreement to pursue nonoperative management with PT to begin shortly.  F/u 6 weeks to reassess
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
## 2024-05-26 NOTE — IMAGING
[de-identified] : Right elbow with mild swelling, skin intact, ecchymosis anteriorly. Distal biceps tendon not palpated.  Full arc of elbow from 0 to 135 and 80/80 pronation/supination. Able to make fist, oppose thumb to small finger and abduct fingers. Sensation intact throughout. <2sec cap refill.   Right elbow radiographs with no fracture nor dislocation, Right elbow MRI from outside facility with complete distal biceps tendon tear.

## 2024-06-04 ENCOUNTER — APPOINTMENT (OUTPATIENT)
Dept: ORTHOPEDIC SURGERY | Facility: CLINIC | Age: 64
End: 2024-06-04
Payer: COMMERCIAL

## 2024-06-04 DIAGNOSIS — S59.909A UNSPECIFIED INJURY OF UNSPECIFIED ELBOW, INITIAL ENCOUNTER: ICD-10-CM

## 2024-06-04 PROCEDURE — 99213 OFFICE O/P EST LOW 20 MIN: CPT

## 2024-06-05 PROBLEM — S59.909A ELBOW INJURY: Status: ACTIVE | Noted: 2024-05-26
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
## 2024-06-05 NOTE — IMAGING
[de-identified] : Right elbow with mild swelling, skin intact, resolved ecchymosis anteriorly. Distal biceps tendon not palpated.  Full arc of elbow from 0 to 135 and 80/80 pronation/supination. Able to make fist, oppose thumb to small finger and abduct fingers. Sensation intact throughout. <2sec cap refill.   Right elbow radiographs with no fracture nor dislocation, Right elbow MRI from outside facility with complete distal biceps tendon tear.

## 2024-06-05 NOTE — ASSESSMENT
[FreeTextEntry1] : Right distal biceps tear - reviewed radiographs and MRI as ordered by an outside provider. We discussed nonoperative vs operative options and course. We discussed expected loss of 10-20% elbow flexion strength and 30-40% supination strength with risk of cramping and persistent contour loss with nonoperative management. We came to mutual agreement to pursue nonoperative management with PT to begin now.  F/u 6 weeks to reassess

## 2024-06-12 ENCOUNTER — APPOINTMENT (OUTPATIENT)
Dept: CARDIOLOGY | Facility: CLINIC | Age: 64
End: 2024-06-12
Payer: COMMERCIAL

## 2024-06-12 VITALS
SYSTOLIC BLOOD PRESSURE: 171 MMHG | BODY MASS INDEX: 30.2 KG/M2 | DIASTOLIC BLOOD PRESSURE: 84 MMHG | TEMPERATURE: 98.3 F | WEIGHT: 223 LBS | OXYGEN SATURATION: 97 % | HEIGHT: 72 IN | HEART RATE: 83 BPM

## 2024-06-12 DIAGNOSIS — I10 ESSENTIAL (PRIMARY) HYPERTENSION: ICD-10-CM

## 2024-06-12 DIAGNOSIS — I48.91 UNSPECIFIED ATRIAL FIBRILLATION: ICD-10-CM

## 2024-06-12 DIAGNOSIS — E66.3 OVERWEIGHT: ICD-10-CM

## 2024-06-12 PROCEDURE — 99214 OFFICE O/P EST MOD 30 MIN: CPT

## 2024-06-12 RX ORDER — NEBIVOLOL 5 MG/1
5 TABLET ORAL DAILY
Qty: 90 | Refills: 3 | Status: ACTIVE | COMMUNITY
Start: 2024-06-12 | End: 1900-01-01

## 2024-06-12 NOTE — ASSESSMENT
[FreeTextEntry1] : He is stable from a cardiac perspective He will restart checking his BP at home (Bystolic was reduced for now and his meds will be adjusted as needed) He will continue to follow with PCP for health maintenance

## 2024-06-12 NOTE — REASON FOR VISIT
[FreeTextEntry1] : He feels well from a cardiac perspective He recently tore his right biceps tendon He notes vertigo symptoms (dizzy when laying down or turning head in bed or sitting up) His afib has been quiescent His BP is elevated; he has not been taking it regularly at home but when he did check it ranged from 130s-150s. Of note his BP did not go down with 10 of Bystolic but his HR is lower. he wants to try 5 of Bystolic

## 2024-07-01 RX ORDER — NEBIVOLOL 5 MG/1
5 TABLET ORAL DAILY
Qty: 90 | Refills: 3 | Status: ACTIVE | COMMUNITY
Start: 2024-07-01 | End: 1900-01-01

## 2024-07-11 ENCOUNTER — APPOINTMENT (OUTPATIENT)
Dept: OTOLARYNGOLOGY | Facility: CLINIC | Age: 64
End: 2024-07-11

## 2024-07-23 ENCOUNTER — APPOINTMENT (OUTPATIENT)
Dept: ORTHOPEDIC SURGERY | Facility: CLINIC | Age: 64
End: 2024-07-23
Payer: COMMERCIAL

## 2024-07-23 DIAGNOSIS — S59.909A UNSPECIFIED INJURY OF UNSPECIFIED ELBOW, INITIAL ENCOUNTER: ICD-10-CM

## 2024-07-23 PROCEDURE — 99213 OFFICE O/P EST LOW 20 MIN: CPT
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
## 2024-07-23 NOTE — ASSESSMENT
[FreeTextEntry1] : Right distal biceps tear - reviewed radiographs and MRI as ordered by an outside provider. We discussed nonoperative vs operative options and course. We discussed expected loss of 10-20% elbow flexion strength and 30-40% supination strength with risk of cramping and persistent contour loss with nonoperative management. We came to mutual agreement to pursue nonoperative management with PT to begin now.  F/u prn

## 2024-07-23 NOTE — IMAGING
[de-identified] : Right elbow with mild swelling, skin intact, resolved ecchymosis anteriorly. Distal biceps tendon not palpated.  Full arc of elbow from 0 to 135 and 80/80 pronation/supination. Able to make fist, oppose thumb to small finger and abduct fingers. Sensation intact throughout. <2sec cap refill.   Right elbow radiographs with no fracture nor dislocation, Right elbow MRI from outside facility with complete distal biceps tendon tear.

## 2024-07-25 ENCOUNTER — APPOINTMENT (OUTPATIENT)
Dept: INTERNAL MEDICINE | Facility: CLINIC | Age: 64
End: 2024-07-25
Payer: COMMERCIAL

## 2024-07-25 VITALS
OXYGEN SATURATION: 97 % | DIASTOLIC BLOOD PRESSURE: 79 MMHG | BODY MASS INDEX: 30.39 KG/M2 | WEIGHT: 224.38 LBS | SYSTOLIC BLOOD PRESSURE: 170 MMHG | HEIGHT: 72 IN | HEART RATE: 85 BPM

## 2024-07-25 VITALS — SYSTOLIC BLOOD PRESSURE: 158 MMHG | DIASTOLIC BLOOD PRESSURE: 78 MMHG

## 2024-07-25 DIAGNOSIS — R05.9 COUGH, UNSPECIFIED: ICD-10-CM

## 2024-07-25 DIAGNOSIS — Z23 ENCOUNTER FOR IMMUNIZATION: ICD-10-CM

## 2024-07-25 DIAGNOSIS — S61.239A PUNCTURE WOUND W/OUT FOREIGN BODY OF UNSPECIFIED FINGER W/OUT DAMAGE TO NAIL, INITIAL ENCOUNTER: ICD-10-CM

## 2024-07-25 DIAGNOSIS — J45.909 UNSPECIFIED ASTHMA, UNCOMPLICATED: ICD-10-CM

## 2024-07-25 DIAGNOSIS — I10 ESSENTIAL (PRIMARY) HYPERTENSION: ICD-10-CM

## 2024-07-25 PROCEDURE — 90715 TDAP VACCINE 7 YRS/> IM: CPT

## 2024-07-25 PROCEDURE — 90471 IMMUNIZATION ADMIN: CPT

## 2024-07-25 PROCEDURE — 99214 OFFICE O/P EST MOD 30 MIN: CPT | Mod: 25

## 2024-07-25 RX ORDER — NEBIVOLOL 10 MG/1
10 TABLET ORAL DAILY
Qty: 90 | Refills: 3 | Status: ACTIVE | COMMUNITY
Start: 2024-07-25 | End: 1900-01-01

## 2024-07-25 RX ORDER — FLUTICASONE FUROATE AND VILANTEROL TRIFENATATE 50; 25 UG/1; UG/1
50-25 POWDER RESPIRATORY (INHALATION) DAILY
Qty: 1 | Refills: 0 | Status: ACTIVE | COMMUNITY
Start: 2024-07-25 | End: 1900-01-01

## 2024-07-25 RX ORDER — MONTELUKAST 10 MG/1
10 TABLET, FILM COATED ORAL DAILY
Qty: 90 | Refills: 3 | Status: ACTIVE | COMMUNITY
Start: 2024-07-25 | End: 1900-01-01

## 2024-07-25 NOTE — HISTORY OF PRESENT ILLNESS
[FreeTextEntry8] : Pt c/o kia nail injury to L thumb yesterday- stuck his thumb and it bled. He was cleaning his porch. Last tetanus shot was between 5 and 10 years ago.  Also c/o dry cough for 6- 8 mths. Used to be on Trelegy but stopped a year ago. Went to ENT recently, going back in August for scope.

## 2024-07-25 NOTE — PLAN
[FreeTextEntry1] : Puncture wound of L thumb- small puncture, no infection. Tdap given  HTN- BP high today. Bystolic was lowered to 5 mg last month, will now increase back to 10 mg Asthma/cough- start Breo 1 puff qd f/u 2 mths to check BP

## 2024-08-19 NOTE — HISTORY OF PRESENT ILLNESS
[Lower back] : lower back [Gradual] : gradual [Sudden] : sudden [Rest] : rest [Exercising] : exercising [6] : 6 [5] : 5 [Burning] : burning [Shooting] : shooting [Stabbing] : stabbing [Intermittent] : intermittent [de-identified] : 5/1/23  Return visit for this 63 year male here today for back pain that began after exercising on the treadmill a few months ago.  With some time the pain did improve, but over the last few days pain returned and worse than originally.  No leg pain. Laying down is okay.  Twisting and turning cause pain.  Using heating pads and Tylenol.  Takes Eliquis for previous DVT and no longer takes low dose aspirin.\par \par PMH:  DVT.  Gout in his knees and toes, and on Colchicine with flare-ups.  Still with elevated uric acid levels and flare-ups are frequent. Has seen a rheumatologist who wanted him to start Allopurinol, but he is hesitant.  Occasional lower back pain.  [] : Post Surgical Visit: no [FreeTextEntry1] : L spine  [FreeTextEntry5] : Pt has had L spine pain for the past few months, pt has had a gradual onset, pt goes to the gym and hs pain with exercising on the lower right side of his L spine \par \par Pt has a hx of gout in his left knee and has recently seed his dr and was going to have xrays of B/L big toes since pt is having pain in those areas since his last flare up of gout  [de-identified] : none  Patient/Caregiver provided printed discharge information.

## 2024-10-09 ENCOUNTER — APPOINTMENT (OUTPATIENT)
Dept: CARDIOLOGY | Facility: CLINIC | Age: 64
End: 2024-10-09
Payer: COMMERCIAL

## 2024-10-09 VITALS
OXYGEN SATURATION: 98 % | HEIGHT: 72 IN | DIASTOLIC BLOOD PRESSURE: 70 MMHG | SYSTOLIC BLOOD PRESSURE: 156 MMHG | HEART RATE: 95 BPM | WEIGHT: 228 LBS | TEMPERATURE: 98 F | BODY MASS INDEX: 30.88 KG/M2

## 2024-10-09 DIAGNOSIS — I10 ESSENTIAL (PRIMARY) HYPERTENSION: ICD-10-CM

## 2024-10-09 DIAGNOSIS — J45.909 UNSPECIFIED ASTHMA, UNCOMPLICATED: ICD-10-CM

## 2024-10-09 DIAGNOSIS — I48.91 UNSPECIFIED ATRIAL FIBRILLATION: ICD-10-CM

## 2024-10-09 DIAGNOSIS — R07.9 CHEST PAIN, UNSPECIFIED: ICD-10-CM

## 2024-10-09 PROCEDURE — 99214 OFFICE O/P EST MOD 30 MIN: CPT

## 2024-10-09 RX ORDER — LOSARTAN POTASSIUM 25 MG/1
25 TABLET, FILM COATED ORAL DAILY
Qty: 90 | Refills: 3 | Status: ACTIVE | COMMUNITY
Start: 2024-10-09 | End: 1900-01-01

## 2024-10-17 ENCOUNTER — APPOINTMENT (OUTPATIENT)
Dept: ORTHOPEDIC SURGERY | Facility: CLINIC | Age: 64
End: 2024-10-17

## 2024-10-30 ENCOUNTER — APPOINTMENT (OUTPATIENT)
Dept: INTERNAL MEDICINE | Facility: CLINIC | Age: 64
End: 2024-10-30

## 2024-11-21 ENCOUNTER — APPOINTMENT (OUTPATIENT)
Dept: CARDIOLOGY | Facility: CLINIC | Age: 64
End: 2024-11-21

## 2024-12-12 ENCOUNTER — APPOINTMENT (OUTPATIENT)
Dept: CARDIOLOGY | Facility: CLINIC | Age: 64
End: 2024-12-12

## 2024-12-18 ENCOUNTER — NON-APPOINTMENT (OUTPATIENT)
Age: 64
End: 2024-12-18

## 2025-01-07 ENCOUNTER — APPOINTMENT (OUTPATIENT)
Dept: ORTHOPEDIC SURGERY | Facility: CLINIC | Age: 65
End: 2025-01-07
Payer: COMMERCIAL

## 2025-01-07 DIAGNOSIS — M50.321 OTHER CERVICAL DISC DEGENERATION AT C4-C5 LEVEL: ICD-10-CM
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
## 2025-01-07 DIAGNOSIS — M51.26 OTHER INTERVERTEBRAL DISC DISPLACEMENT, LUMBAR REGION: ICD-10-CM

## 2025-01-07 DIAGNOSIS — M50.323 OTHER CERVICAL DISC DEGENERATION AT C6-C7 LEVEL: ICD-10-CM

## 2025-01-07 DIAGNOSIS — M50.322 OTHER CERVICAL DISC DEGENERATION AT C5-C6 LEVEL: ICD-10-CM

## 2025-01-07 PROCEDURE — 99214 OFFICE O/P EST MOD 30 MIN: CPT

## 2025-01-13 ENCOUNTER — APPOINTMENT (OUTPATIENT)
Dept: CARDIOLOGY | Facility: CLINIC | Age: 65
End: 2025-01-13

## 2025-01-13 PROCEDURE — 93015 CV STRESS TEST SUPVJ I&R: CPT

## 2025-01-13 PROCEDURE — 36415 COLL VENOUS BLD VENIPUNCTURE: CPT

## 2025-01-15 ENCOUNTER — OFFICE (OUTPATIENT)
Dept: URBAN - METROPOLITAN AREA CLINIC 12 | Facility: CLINIC | Age: 65
Setting detail: OPHTHALMOLOGY
End: 2025-01-15
Payer: COMMERCIAL

## 2025-01-15 DIAGNOSIS — H26.491: ICD-10-CM

## 2025-01-15 DIAGNOSIS — H43.393: ICD-10-CM

## 2025-01-15 DIAGNOSIS — Z96.1: ICD-10-CM

## 2025-01-15 DIAGNOSIS — H25.12: ICD-10-CM

## 2025-01-15 DIAGNOSIS — H43.812: ICD-10-CM

## 2025-01-15 PROCEDURE — 99214 OFFICE O/P EST MOD 30 MIN: CPT | Performed by: OPHTHALMOLOGY

## 2025-01-15 ASSESSMENT — REFRACTION_CURRENTRX
OD_AXIS: 000
OS_CYLINDER: -0.50
OD_SPHERE: +2.50
OD_OVR_VA: 20/
OS_AXIS: 090
OD_CYLINDER: SPHERE
OS_SPHERE: +2.75
OS_OVR_VA: 20/

## 2025-01-15 ASSESSMENT — KERATOMETRY
OD_AXISANGLE_DEGREES: 133
OD_K2POWER_DIOPTERS: 44.00
OS_AXISANGLE_DEGREES: 061
OD_K1POWER_DIOPTERS: 42.75
OS_K1POWER_DIOPTERS: 42.75
OS_K2POWER_DIOPTERS: 43.50

## 2025-01-15 ASSESSMENT — REFRACTION_MANIFEST
OD_SPHERE: +1.00
OD_CYLINDER: -1.00
OD_VA1: 20/80
OD_VA1: 20/NI
OD_CYLINDER: -0.25
OD_SPHERE: +0.50
OD_AXIS: 063
OD_AXIS: 055
OS_AXIS: 123
OS_CYLINDER: -0.75
OS_AXIS: 115
OS_VA1: 20/25
OS_SPHERE: +1.75
OS_VA1: 20/50
OS_CYLINDER: -0.75
OS_SPHERE: +1.00

## 2025-01-15 ASSESSMENT — REFRACTION_AUTOREFRACTION
OD_AXIS: 053
OD_CYLINDER: -1.00
OD_SPHERE: +0.50
OS_SPHERE: +1.00
OS_AXIS: 113
OS_CYLINDER: -0.75

## 2025-01-15 ASSESSMENT — CONFRONTATIONAL VISUAL FIELD TEST (CVF)
OS_FINDINGS: FULL
OD_FINDINGS: FULL

## 2025-01-15 ASSESSMENT — TONOMETRY
OS_IOP_MMHG: 12
OD_IOP_MMHG: 11

## 2025-01-15 ASSESSMENT — VISUAL ACUITY
OS_BCVA: 20/25+
OD_BCVA: 20/80+2

## 2025-01-16 LAB
ALBUMIN SERPL ELPH-MCNC: 5.1 G/DL
ALP BLD-CCNC: 62 U/L
ALT SERPL-CCNC: 22 U/L
ANION GAP SERPL CALC-SCNC: 17 MMOL/L
AST SERPL-CCNC: 29 U/L
BILIRUB SERPL-MCNC: 0.7 MG/DL
BUN SERPL-MCNC: 26 MG/DL
CALCIUM SERPL-MCNC: 9.8 MG/DL
CHLORIDE SERPL-SCNC: 104 MMOL/L
CHOLEST SERPL-MCNC: 237 MG/DL
CO2 SERPL-SCNC: 19 MMOL/L
CREAT SERPL-MCNC: 1.41 MG/DL
EGFR: 56 ML/MIN/1.73M2
GLUCOSE SERPL-MCNC: 106 MG/DL
HCT VFR BLD CALC: 45.1 %
HDLC SERPL-MCNC: 50 MG/DL
HGB BLD-MCNC: 14.3 G/DL
LDLC SERPL CALC-MCNC: 157 MG/DL
MCHC RBC-ENTMCNC: 28.7 PG
MCHC RBC-ENTMCNC: 31.7 G/DL
MCV RBC AUTO: 90.4 FL
NONHDLC SERPL-MCNC: 187 MG/DL
PLATELET # BLD AUTO: 273 K/UL
POTASSIUM SERPL-SCNC: 5 MMOL/L
PROT SERPL-MCNC: 7.5 G/DL
RBC # BLD: 4.99 M/UL
RBC # FLD: 12.7 %
SODIUM SERPL-SCNC: 140 MMOL/L
TRIGL SERPL-MCNC: 167 MG/DL
TSH SERPL-ACNC: 2.16 UIU/ML
URATE SERPL-MCNC: 9.1 MG/DL
WBC # FLD AUTO: 7.34 K/UL

## 2025-01-28 ENCOUNTER — NON-APPOINTMENT (OUTPATIENT)
Age: 65
End: 2025-01-28

## 2025-01-31 ENCOUNTER — APPOINTMENT (OUTPATIENT)
Dept: ORTHOPEDIC SURGERY | Facility: CLINIC | Age: 65
End: 2025-01-31

## 2025-01-31 VITALS — BODY MASS INDEX: 31.83 KG/M2 | WEIGHT: 235 LBS | HEIGHT: 72 IN

## 2025-01-31 PROCEDURE — 99213 OFFICE O/P EST LOW 20 MIN: CPT

## 2025-02-03 ENCOUNTER — APPOINTMENT (OUTPATIENT)
Dept: MRI IMAGING | Facility: CLINIC | Age: 65
End: 2025-02-03
Payer: MEDICARE

## 2025-02-03 PROCEDURE — 72148 MRI LUMBAR SPINE W/O DYE: CPT

## 2025-02-03 PROCEDURE — 72141 MRI NECK SPINE W/O DYE: CPT

## 2025-02-06 ENCOUNTER — APPOINTMENT (OUTPATIENT)
Dept: ORTHOPEDIC SURGERY | Facility: CLINIC | Age: 65
End: 2025-02-06
Payer: MEDICARE

## 2025-02-06 DIAGNOSIS — M50.321 OTHER CERVICAL DISC DEGENERATION AT C4-C5 LEVEL: ICD-10-CM

## 2025-02-06 DIAGNOSIS — M51.26 OTHER INTERVERTEBRAL DISC DISPLACEMENT, LUMBAR REGION: ICD-10-CM
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
## 2025-02-06 DIAGNOSIS — M50.323 OTHER CERVICAL DISC DEGENERATION AT C6-C7 LEVEL: ICD-10-CM

## 2025-02-06 DIAGNOSIS — M50.322 OTHER CERVICAL DISC DEGENERATION AT C5-C6 LEVEL: ICD-10-CM

## 2025-02-06 DIAGNOSIS — M51.369: ICD-10-CM

## 2025-02-06 PROCEDURE — 99204 OFFICE O/P NEW MOD 45 MIN: CPT

## 2025-02-07 ENCOUNTER — APPOINTMENT (OUTPATIENT)
Dept: ORTHOPEDIC SURGERY | Facility: CLINIC | Age: 65
End: 2025-02-07

## 2025-03-20 NOTE — CONSULT NOTE ADULT - PROBLEM SELECTOR PROBLEM 3
The following individual(s) verbally consented to be recorded using ambient AI listening technology and understand that they can each withdraw their consent to this listening technology at any point by asking the clinician to turn off or pause the recording:    Patient name: Nasir BREWER Quynh    
Atrial fibrillation, unspecified type
Pleural effusion on left

## 2025-04-01 ENCOUNTER — APPOINTMENT (OUTPATIENT)
Dept: ORTHOPEDIC SURGERY | Facility: CLINIC | Age: 65
End: 2025-04-01

## 2025-04-03 ENCOUNTER — RX ONLY (RX ONLY)
Age: 65
End: 2025-04-03

## 2025-06-16 ENCOUNTER — APPOINTMENT (OUTPATIENT)
Dept: ORTHOPEDIC SURGERY | Facility: CLINIC | Age: 65
End: 2025-06-16

## 2025-06-18 ENCOUNTER — NON-APPOINTMENT (OUTPATIENT)
Age: 65
End: 2025-06-18

## 2025-06-28 ENCOUNTER — RX RENEWAL (OUTPATIENT)
Age: 65
End: 2025-06-28

## 2025-07-10 ENCOUNTER — RX RENEWAL (OUTPATIENT)
Age: 65
End: 2025-07-10

## 2025-07-29 ENCOUNTER — NON-APPOINTMENT (OUTPATIENT)
Age: 65
End: 2025-07-29

## 2025-07-31 ENCOUNTER — APPOINTMENT (OUTPATIENT)
Dept: CARDIOLOGY | Facility: CLINIC | Age: 65
End: 2025-07-31
Payer: MEDICARE

## 2025-07-31 VITALS
SYSTOLIC BLOOD PRESSURE: 142 MMHG | HEIGHT: 72 IN | HEART RATE: 65 BPM | WEIGHT: 230 LBS | BODY MASS INDEX: 31.15 KG/M2 | OXYGEN SATURATION: 98 % | DIASTOLIC BLOOD PRESSURE: 80 MMHG

## 2025-07-31 DIAGNOSIS — I10 ESSENTIAL (PRIMARY) HYPERTENSION: ICD-10-CM

## 2025-07-31 DIAGNOSIS — I48.91 UNSPECIFIED ATRIAL FIBRILLATION: ICD-10-CM

## 2025-07-31 PROCEDURE — 99204 OFFICE O/P NEW MOD 45 MIN: CPT

## 2025-07-31 PROCEDURE — G2211 COMPLEX E/M VISIT ADD ON: CPT

## 2025-07-31 RX ORDER — APIXABAN 2.5 MG/1
2.5 TABLET, FILM COATED ORAL
Qty: 180 | Refills: 1 | Status: ACTIVE | COMMUNITY
Start: 2025-07-31 | End: 1900-01-01

## 2025-09-18 ENCOUNTER — APPOINTMENT (OUTPATIENT)
Dept: INTERNAL MEDICINE | Facility: CLINIC | Age: 65
End: 2025-09-18
Payer: MEDICARE

## 2025-09-18 VITALS
DIASTOLIC BLOOD PRESSURE: 75 MMHG | SYSTOLIC BLOOD PRESSURE: 140 MMHG | HEIGHT: 72 IN | OXYGEN SATURATION: 96 % | HEART RATE: 74 BPM | BODY MASS INDEX: 32.37 KG/M2 | WEIGHT: 239 LBS

## 2025-09-18 DIAGNOSIS — I10 ESSENTIAL (PRIMARY) HYPERTENSION: ICD-10-CM

## 2025-09-18 DIAGNOSIS — R35.0 FREQUENCY OF MICTURITION: ICD-10-CM

## 2025-09-18 DIAGNOSIS — J45.909 UNSPECIFIED ASTHMA, UNCOMPLICATED: ICD-10-CM

## 2025-09-18 DIAGNOSIS — Z12.11 ENCOUNTER FOR SCREENING FOR MALIGNANT NEOPLASM OF COLON: ICD-10-CM

## 2025-09-18 PROCEDURE — G0402 INITIAL PREVENTIVE EXAM: CPT

## 2025-09-19 DIAGNOSIS — E78.00 PURE HYPERCHOLESTEROLEMIA, UNSPECIFIED: ICD-10-CM

## 2025-09-19 LAB
25(OH)D3 SERPL-MCNC: 58.5 NG/ML
ALBUMIN SERPL ELPH-MCNC: 4.9 G/DL
ALP BLD-CCNC: 57 U/L
ALT SERPL-CCNC: 34 U/L
ANION GAP SERPL CALC-SCNC: 14 MMOL/L
APPEARANCE: CLEAR
AST SERPL-CCNC: 35 U/L
BACTERIA: NEGATIVE /HPF
BASOPHILS # BLD AUTO: 0.06 K/UL
BASOPHILS NFR BLD AUTO: 0.7 %
BILIRUB SERPL-MCNC: 0.6 MG/DL
BILIRUBIN URINE: NEGATIVE
BLOOD URINE: NEGATIVE
BUN SERPL-MCNC: 19 MG/DL
CALCIUM SERPL-MCNC: 9.9 MG/DL
CAST: 0 /LPF
CHLORIDE SERPL-SCNC: 100 MMOL/L
CHOLEST SERPL-MCNC: 247 MG/DL
CO2 SERPL-SCNC: 26 MMOL/L
COLOR: YELLOW
CREAT SERPL-MCNC: 1.32 MG/DL
EGFRCR SERPLBLD CKD-EPI 2021: 60 ML/MIN/1.73M2
EOSINOPHIL # BLD AUTO: 0.31 K/UL
EOSINOPHIL NFR BLD AUTO: 3.6 %
EPITHELIAL CELLS: 0 /HPF
ESTIMATED AVERAGE GLUCOSE: 128 MG/DL
GLUCOSE QUALITATIVE U: NEGATIVE MG/DL
GLUCOSE SERPL-MCNC: 91 MG/DL
HBA1C MFR BLD HPLC: 6.1 %
HCT VFR BLD CALC: 42.8 %
HCV AB SER QL: NONREACTIVE
HCV S/CO RATIO: 0.08 S/CO
HDLC SERPL-MCNC: 53 MG/DL
HGB BLD-MCNC: 14 G/DL
IMM GRANULOCYTES NFR BLD AUTO: 0.3 %
KETONES URINE: NEGATIVE MG/DL
LDLC SERPL-MCNC: 167 MG/DL
LEUKOCYTE ESTERASE URINE: NEGATIVE
LYMPHOCYTES # BLD AUTO: 2.48 K/UL
LYMPHOCYTES NFR BLD AUTO: 28.8 %
MAN DIFF?: NORMAL
MCHC RBC-ENTMCNC: 29.4 PG
MCHC RBC-ENTMCNC: 32.7 G/DL
MCV RBC AUTO: 89.9 FL
MICROSCOPIC-UA: NORMAL
MONOCYTES # BLD AUTO: 0.74 K/UL
MONOCYTES NFR BLD AUTO: 8.6 %
NEUTROPHILS # BLD AUTO: 5 K/UL
NEUTROPHILS NFR BLD AUTO: 58 %
NITRITE URINE: NEGATIVE
NONHDLC SERPL-MCNC: 194 MG/DL
PH URINE: 6.5
PLATELET # BLD AUTO: 221 K/UL
POTASSIUM SERPL-SCNC: 5.2 MMOL/L
PROT SERPL-MCNC: 7.4 G/DL
PROTEIN URINE: NEGATIVE MG/DL
PSA FREE FLD-MCNC: 21 %
PSA FREE SERPL-MCNC: 0.45 NG/ML
PSA SERPL-MCNC: 2.18 NG/ML
RBC # BLD: 4.76 M/UL
RBC # FLD: 12.2 %
RED BLOOD CELLS URINE: 0 /HPF
SODIUM SERPL-SCNC: 139 MMOL/L
SPECIFIC GRAVITY URINE: 1.02
TESTOST SERPL-MCNC: 192 NG/DL
TRIGL SERPL-MCNC: 148 MG/DL
TSH SERPL-ACNC: 1.66 UIU/ML
UROBILINOGEN URINE: 0.2 MG/DL
WBC # FLD AUTO: 8.62 K/UL
WHITE BLOOD CELLS URINE: 0 /HPF

## 2025-09-19 RX ORDER — COLCHICINE 0.6 MG/1
0.6 TABLET ORAL TWICE DAILY
Qty: 90 | Refills: 2 | Status: ACTIVE | COMMUNITY
Start: 2025-09-19 | End: 1900-01-01

## 2025-09-19 RX ORDER — ROSUVASTATIN CALCIUM 5 MG/1
5 TABLET, FILM COATED ORAL DAILY
Qty: 90 | Refills: 3 | Status: ACTIVE | COMMUNITY
Start: 2025-09-19 | End: 1900-01-01